# Patient Record
Sex: MALE | URBAN - METROPOLITAN AREA
[De-identification: names, ages, dates, MRNs, and addresses within clinical notes are randomized per-mention and may not be internally consistent; named-entity substitution may affect disease eponyms.]

---

## 2017-01-02 PROBLEM — R07.9 CHEST PAIN: Status: ACTIVE | Noted: 2017-01-02

## 2017-01-04 ENCOUNTER — CARE COORDINATION (OUTPATIENT)
Dept: CARE COORDINATION | Age: 47
End: 2017-01-04

## 2017-01-10 ENCOUNTER — TELEPHONE (OUTPATIENT)
Dept: INTERNAL MEDICINE | Age: 47
End: 2017-01-10

## 2017-01-10 RX ORDER — TAMSULOSIN HYDROCHLORIDE 0.4 MG/1
0.4 CAPSULE ORAL DAILY
Qty: 30 CAPSULE | Refills: 12 | Status: SHIPPED | OUTPATIENT
Start: 2017-01-10 | End: 2017-02-07 | Stop reason: ALTCHOICE

## 2017-01-13 ENCOUNTER — OFFICE VISIT (OUTPATIENT)
Dept: INTERNAL MEDICINE | Age: 47
End: 2017-01-13

## 2017-01-13 VITALS
DIASTOLIC BLOOD PRESSURE: 86 MMHG | SYSTOLIC BLOOD PRESSURE: 128 MMHG | OXYGEN SATURATION: 93 % | WEIGHT: 245 LBS | BODY MASS INDEX: 26.91 KG/M2 | HEART RATE: 79 BPM

## 2017-01-13 DIAGNOSIS — R07.9 CHEST PAIN, UNSPECIFIED TYPE: Primary | ICD-10-CM

## 2017-01-13 PROCEDURE — 99999 PR OFFICE/OUTPT VISIT,PROCEDURE ONLY: CPT | Performed by: INTERNAL MEDICINE

## 2017-01-16 ENCOUNTER — OFFICE VISIT (OUTPATIENT)
Dept: INTERNAL MEDICINE | Age: 47
End: 2017-01-16

## 2017-01-16 VITALS
OXYGEN SATURATION: 98 % | HEART RATE: 85 BPM | HEIGHT: 78 IN | SYSTOLIC BLOOD PRESSURE: 130 MMHG | BODY MASS INDEX: 28.58 KG/M2 | DIASTOLIC BLOOD PRESSURE: 80 MMHG | WEIGHT: 247 LBS

## 2017-01-16 DIAGNOSIS — I51.7 LEFT VENTRICULAR HYPERTROPHY: ICD-10-CM

## 2017-01-16 DIAGNOSIS — R07.9 CHEST PAIN, UNSPECIFIED TYPE: Primary | ICD-10-CM

## 2017-01-16 DIAGNOSIS — I51.89 DIASTOLIC DYSFUNCTION: ICD-10-CM

## 2017-01-16 DIAGNOSIS — K21.9 GASTROESOPHAGEAL REFLUX DISEASE WITHOUT ESOPHAGITIS: Chronic | ICD-10-CM

## 2017-01-16 DIAGNOSIS — R68.89 HYPOKINESIS: ICD-10-CM

## 2017-01-16 PROCEDURE — 99213 OFFICE O/P EST LOW 20 MIN: CPT | Performed by: INTERNAL MEDICINE

## 2017-01-16 RX ORDER — ASPIRIN 81 MG/1
81 TABLET ORAL DAILY
COMMUNITY
Start: 2017-01-16 | End: 2017-02-01 | Stop reason: ALTCHOICE

## 2017-01-16 RX ORDER — OMEPRAZOLE 40 MG/1
40 CAPSULE, DELAYED RELEASE ORAL DAILY
Qty: 30 CAPSULE | Refills: 12 | Status: SHIPPED | OUTPATIENT
Start: 2017-01-16 | End: 2017-02-15

## 2017-01-16 ASSESSMENT — ENCOUNTER SYMPTOMS
CHEST TIGHTNESS: 0
APNEA: 0
EYES NEGATIVE: 1
STRIDOR: 0
COUGH: 0
WHEEZING: 0
SHORTNESS OF BREATH: 1
CHOKING: 0
GASTROINTESTINAL NEGATIVE: 1

## 2017-01-25 DIAGNOSIS — I25.10 ARTERIOSCLEROTIC CORONARY ARTERY DISEASE: Primary | ICD-10-CM

## 2017-01-27 ENCOUNTER — TELEPHONE (OUTPATIENT)
Dept: INTERNAL MEDICINE | Age: 47
End: 2017-01-27

## 2017-02-01 ENCOUNTER — OFFICE VISIT (OUTPATIENT)
Dept: CARDIOLOGY CLINIC | Age: 47
End: 2017-02-01

## 2017-02-01 VITALS
DIASTOLIC BLOOD PRESSURE: 70 MMHG | BODY MASS INDEX: 28.11 KG/M2 | HEART RATE: 69 BPM | HEIGHT: 78 IN | WEIGHT: 243 LBS | SYSTOLIC BLOOD PRESSURE: 108 MMHG

## 2017-02-01 DIAGNOSIS — I51.7 LEFT VENTRICULAR HYPERTROPHY: ICD-10-CM

## 2017-02-01 DIAGNOSIS — I10 ESSENTIAL HYPERTENSION: ICD-10-CM

## 2017-02-01 DIAGNOSIS — R07.89 OTHER CHEST PAIN: Primary | ICD-10-CM

## 2017-02-01 DIAGNOSIS — E78.00 PURE HYPERCHOLESTEROLEMIA: ICD-10-CM

## 2017-02-01 PROCEDURE — 93000 ELECTROCARDIOGRAM COMPLETE: CPT | Performed by: INTERNAL MEDICINE

## 2017-02-01 PROCEDURE — 99215 OFFICE O/P EST HI 40 MIN: CPT | Performed by: INTERNAL MEDICINE

## 2017-02-01 RX ORDER — ASPIRIN 81 MG/1
81 TABLET ORAL DAILY
COMMUNITY
End: 2017-02-01 | Stop reason: SDUPTHER

## 2017-02-01 RX ORDER — ASPIRIN 81 MG/1
81 TABLET ORAL DAILY
COMMUNITY
End: 2017-05-09 | Stop reason: SDUPTHER

## 2017-02-03 DIAGNOSIS — R06.09 EXERTIONAL DYSPNEA: ICD-10-CM

## 2017-02-03 DIAGNOSIS — R07.89 OTHER CHEST PAIN: ICD-10-CM

## 2017-02-03 DIAGNOSIS — R94.31 ABNORMAL ECG: Primary | ICD-10-CM

## 2017-02-07 ENCOUNTER — TELEPHONE (OUTPATIENT)
Dept: INTERNAL MEDICINE | Age: 47
End: 2017-02-07

## 2017-02-07 RX ORDER — SILODOSIN 4 MG/1
4 CAPSULE ORAL EVERY EVENING
Qty: 30 CAPSULE | Refills: 5 | Status: SHIPPED | OUTPATIENT
Start: 2017-02-07 | End: 2017-08-09 | Stop reason: SDUPTHER

## 2017-02-08 ENCOUNTER — HOSPITAL ENCOUNTER (OUTPATIENT)
Dept: CARDIAC CATH/INVASIVE PROCEDURES | Age: 47
Discharge: HOME | End: 2017-02-08
Attending: INTERNAL MEDICINE | Admitting: INTERNAL MEDICINE

## 2017-02-08 VITALS — HEIGHT: 78 IN | BODY MASS INDEX: 28.46 KG/M2 | WEIGHT: 246 LBS | TEMPERATURE: 98.1 F

## 2017-02-08 LAB
ANION GAP SERPL CALCULATED.3IONS-SCNC: 11 MMOL/L (ref 3–16)
BASOPHILS ABSOLUTE: 0 K/UL (ref 0–0.2)
BASOPHILS RELATIVE PERCENT: 0.7 %
BUN BLDV-MCNC: 19 MG/DL (ref 7–20)
CALCIUM SERPL-MCNC: 9.7 MG/DL (ref 8.3–10.6)
CHLORIDE BLD-SCNC: 100 MMOL/L (ref 99–110)
CO2: 27 MMOL/L (ref 21–32)
CREAT SERPL-MCNC: 0.9 MG/DL (ref 0.9–1.3)
EOSINOPHILS ABSOLUTE: 0.1 K/UL (ref 0–0.6)
EOSINOPHILS RELATIVE PERCENT: 2.1 %
GFR AFRICAN AMERICAN: >60
GFR NON-AFRICAN AMERICAN: >60
GLUCOSE BLD-MCNC: 97 MG/DL (ref 70–99)
HCT VFR BLD CALC: 41.9 % (ref 40.5–52.5)
HEMOGLOBIN: 14.3 G/DL (ref 13.5–17.5)
INR BLD: 1.04 (ref 0.85–1.16)
LYMPHOCYTES ABSOLUTE: 2 K/UL (ref 1–5.1)
LYMPHOCYTES RELATIVE PERCENT: 46.1 %
MCH RBC QN AUTO: 28.8 PG (ref 26–34)
MCHC RBC AUTO-ENTMCNC: 34.1 G/DL (ref 31–36)
MCV RBC AUTO: 84.5 FL (ref 80–100)
MONOCYTES ABSOLUTE: 0.3 K/UL (ref 0–1.3)
MONOCYTES RELATIVE PERCENT: 7.3 %
NEUTROPHILS ABSOLUTE: 1.9 K/UL (ref 1.7–7.7)
NEUTROPHILS RELATIVE PERCENT: 43.8 %
PDW BLD-RTO: 13 % (ref 12.4–15.4)
PLATELET # BLD: 279 K/UL (ref 135–450)
PMV BLD AUTO: 7 FL (ref 5–10.5)
POTASSIUM SERPL-SCNC: 3.9 MMOL/L (ref 3.5–5.1)
PROTHROMBIN TIME: 11.9 SEC (ref 9.8–13)
RBC # BLD: 4.96 M/UL (ref 4.2–5.9)
SODIUM BLD-SCNC: 138 MMOL/L (ref 136–145)
WBC # BLD: 4.3 K/UL (ref 4–11)

## 2017-02-08 RX ORDER — SODIUM CHLORIDE 0.9 % (FLUSH) 0.9 %
10 SYRINGE (ML) INJECTION EVERY 12 HOURS SCHEDULED
Status: DISCONTINUED | OUTPATIENT
Start: 2017-02-08 | End: 2017-02-09 | Stop reason: HOSPADM

## 2017-02-08 RX ORDER — SODIUM CHLORIDE 9 MG/ML
INJECTION, SOLUTION INTRAVENOUS CONTINUOUS
Status: ACTIVE | OUTPATIENT
Start: 2017-02-08 | End: 2017-02-08

## 2017-02-08 RX ORDER — SODIUM CHLORIDE 0.9 % (FLUSH) 0.9 %
10 SYRINGE (ML) INJECTION PRN
Status: DISCONTINUED | OUTPATIENT
Start: 2017-02-08 | End: 2017-02-09 | Stop reason: HOSPADM

## 2017-02-08 RX ORDER — ONDANSETRON 2 MG/ML
4 INJECTION INTRAMUSCULAR; INTRAVENOUS EVERY 6 HOURS PRN
Status: DISCONTINUED | OUTPATIENT
Start: 2017-02-08 | End: 2017-02-09 | Stop reason: HOSPADM

## 2017-02-08 RX ORDER — ACETAMINOPHEN 325 MG/1
650 TABLET ORAL EVERY 4 HOURS PRN
Status: DISCONTINUED | OUTPATIENT
Start: 2017-02-08 | End: 2017-02-09 | Stop reason: HOSPADM

## 2017-02-09 LAB
EKG ATRIAL RATE: 71 BPM
EKG DIAGNOSIS: NORMAL
EKG P AXIS: 47 DEGREES
EKG P-R INTERVAL: 186 MS
EKG Q-T INTERVAL: 406 MS
EKG QRS DURATION: 112 MS
EKG QTC CALCULATION (BAZETT): 441 MS
EKG R AXIS: 26 DEGREES
EKG T AXIS: 16 DEGREES
EKG VENTRICULAR RATE: 71 BPM

## 2017-02-15 ENCOUNTER — OFFICE VISIT (OUTPATIENT)
Dept: CARDIOLOGY CLINIC | Age: 47
End: 2017-02-15

## 2017-02-15 VITALS
HEART RATE: 65 BPM | BODY MASS INDEX: 28.02 KG/M2 | SYSTOLIC BLOOD PRESSURE: 112 MMHG | DIASTOLIC BLOOD PRESSURE: 70 MMHG | HEIGHT: 78 IN | WEIGHT: 242.2 LBS

## 2017-02-15 DIAGNOSIS — I51.7 LEFT VENTRICULAR HYPERTROPHY: ICD-10-CM

## 2017-02-15 DIAGNOSIS — E78.00 PURE HYPERCHOLESTEROLEMIA: ICD-10-CM

## 2017-02-15 DIAGNOSIS — Z98.890 S/P CORONARY ANGIOGRAM: ICD-10-CM

## 2017-02-15 DIAGNOSIS — I10 ESSENTIAL HYPERTENSION: Primary | ICD-10-CM

## 2017-02-15 PROCEDURE — 99214 OFFICE O/P EST MOD 30 MIN: CPT | Performed by: NURSE PRACTITIONER

## 2017-03-29 ENCOUNTER — OFFICE VISIT (OUTPATIENT)
Dept: CARDIOLOGY CLINIC | Age: 47
End: 2017-03-29

## 2017-03-29 VITALS
HEART RATE: 82 BPM | DIASTOLIC BLOOD PRESSURE: 60 MMHG | BODY MASS INDEX: 26.26 KG/M2 | SYSTOLIC BLOOD PRESSURE: 102 MMHG | WEIGHT: 239 LBS

## 2017-03-29 DIAGNOSIS — E78.00 PURE HYPERCHOLESTEROLEMIA: ICD-10-CM

## 2017-03-29 DIAGNOSIS — I25.10 ARTERIOSCLEROTIC CORONARY ARTERY DISEASE: Primary | ICD-10-CM

## 2017-03-29 DIAGNOSIS — I10 ESSENTIAL HYPERTENSION: Chronic | ICD-10-CM

## 2017-03-29 DIAGNOSIS — R94.31 ABNORMAL ECG: ICD-10-CM

## 2017-03-29 PROCEDURE — 99214 OFFICE O/P EST MOD 30 MIN: CPT | Performed by: INTERNAL MEDICINE

## 2017-04-03 LAB
A/G RATIO: 1.3 (ref 1.1–2.2)
ALBUMIN SERPL-MCNC: 4.3 G/DL (ref 3.4–5)
ALP BLD-CCNC: 65 U/L (ref 40–129)
ALT SERPL-CCNC: 45 U/L (ref 10–40)
ANION GAP SERPL CALCULATED.3IONS-SCNC: 15 MMOL/L (ref 3–16)
AST SERPL-CCNC: 34 U/L (ref 15–37)
BILIRUB SERPL-MCNC: 0.3 MG/DL (ref 0–1)
BUN BLDV-MCNC: 15 MG/DL (ref 7–20)
CALCIUM SERPL-MCNC: 10 MG/DL (ref 8.3–10.6)
CHLORIDE BLD-SCNC: 102 MMOL/L (ref 99–110)
CHOLESTEROL, TOTAL: 202 MG/DL (ref 0–199)
CO2: 26 MMOL/L (ref 21–32)
CREAT SERPL-MCNC: 1 MG/DL (ref 0.9–1.3)
GFR AFRICAN AMERICAN: >60
GFR NON-AFRICAN AMERICAN: >60
GLOBULIN: 3.2 G/DL
GLUCOSE BLD-MCNC: 94 MG/DL (ref 70–99)
HDLC SERPL-MCNC: 60 MG/DL (ref 40–60)
LDL CHOLESTEROL CALCULATED: 130 MG/DL
POTASSIUM SERPL-SCNC: 4.3 MMOL/L (ref 3.5–5.1)
SODIUM BLD-SCNC: 143 MMOL/L (ref 136–145)
TOTAL PROTEIN: 7.5 G/DL (ref 6.4–8.2)
TRIGL SERPL-MCNC: 59 MG/DL (ref 0–150)
VLDLC SERPL CALC-MCNC: 12 MG/DL

## 2017-04-19 ENCOUNTER — OFFICE VISIT (OUTPATIENT)
Dept: INTERNAL MEDICINE | Age: 47
End: 2017-04-19

## 2017-04-19 VITALS
OXYGEN SATURATION: 98 % | BODY MASS INDEX: 27.42 KG/M2 | WEIGHT: 237 LBS | HEIGHT: 78 IN | HEART RATE: 74 BPM | SYSTOLIC BLOOD PRESSURE: 130 MMHG | DIASTOLIC BLOOD PRESSURE: 88 MMHG

## 2017-04-19 DIAGNOSIS — I10 ESSENTIAL HYPERTENSION: Primary | Chronic | ICD-10-CM

## 2017-04-19 DIAGNOSIS — E78.5 HYPERLIPIDEMIA, UNSPECIFIED HYPERLIPIDEMIA TYPE: Chronic | ICD-10-CM

## 2017-04-19 PROCEDURE — 99214 OFFICE O/P EST MOD 30 MIN: CPT | Performed by: INTERNAL MEDICINE

## 2017-04-19 RX ORDER — ATORVASTATIN CALCIUM 40 MG/1
40 TABLET, FILM COATED ORAL DAILY
Qty: 90 TABLET | Refills: 3 | Status: SHIPPED | OUTPATIENT
Start: 2017-04-19 | End: 2017-05-09 | Stop reason: SDUPTHER

## 2017-04-19 ASSESSMENT — ENCOUNTER SYMPTOMS
SHORTNESS OF BREATH: 0
ORTHOPNEA: 0
BLURRED VISION: 0

## 2017-04-19 ASSESSMENT — PATIENT HEALTH QUESTIONNAIRE - PHQ9
SUM OF ALL RESPONSES TO PHQ9 QUESTIONS 1 & 2: 0
2. FEELING DOWN, DEPRESSED OR HOPELESS: 0
SUM OF ALL RESPONSES TO PHQ QUESTIONS 1-9: 0
1. LITTLE INTEREST OR PLEASURE IN DOING THINGS: 0

## 2017-04-21 ENCOUNTER — TELEPHONE (OUTPATIENT)
Dept: INTERNAL MEDICINE | Age: 47
End: 2017-04-21

## 2017-04-22 ASSESSMENT — ENCOUNTER SYMPTOMS
EYES NEGATIVE: 1
RESPIRATORY NEGATIVE: 1
GASTROINTESTINAL NEGATIVE: 1

## 2017-05-09 DIAGNOSIS — E78.5 HYPERLIPIDEMIA, UNSPECIFIED HYPERLIPIDEMIA TYPE: Chronic | ICD-10-CM

## 2017-05-09 RX ORDER — ATORVASTATIN CALCIUM 40 MG/1
40 TABLET, FILM COATED ORAL DAILY
Qty: 90 TABLET | Refills: 1 | Status: SHIPPED | OUTPATIENT
Start: 2017-05-09 | End: 2018-05-02 | Stop reason: SDUPTHER

## 2017-05-09 RX ORDER — ASPIRIN 81 MG/1
81 TABLET ORAL DAILY
Qty: 30 TABLET | Refills: 5 | Status: SHIPPED | OUTPATIENT
Start: 2017-05-09 | End: 2017-11-27 | Stop reason: SDUPTHER

## 2017-08-09 RX ORDER — SILODOSIN 4 MG/1
CAPSULE ORAL
Qty: 30 CAPSULE | Refills: 1 | Status: SHIPPED | OUTPATIENT
Start: 2017-08-09 | End: 2017-08-21 | Stop reason: ALTCHOICE

## 2017-08-21 ENCOUNTER — OFFICE VISIT (OUTPATIENT)
Dept: INTERNAL MEDICINE | Age: 47
End: 2017-08-21

## 2017-08-21 ENCOUNTER — OFFICE VISIT (OUTPATIENT)
Dept: ORTHOPEDIC SURGERY | Age: 47
End: 2017-08-21

## 2017-08-21 VITALS
TEMPERATURE: 97.5 F | HEART RATE: 70 BPM | HEIGHT: 78 IN | SYSTOLIC BLOOD PRESSURE: 130 MMHG | OXYGEN SATURATION: 97 % | DIASTOLIC BLOOD PRESSURE: 82 MMHG | BODY MASS INDEX: 27.19 KG/M2 | WEIGHT: 235 LBS

## 2017-08-21 VITALS — HEIGHT: 78 IN | WEIGHT: 235 LBS | BODY MASS INDEX: 27.19 KG/M2

## 2017-08-21 DIAGNOSIS — Z00.00 ROUTINE GENERAL MEDICAL EXAMINATION AT A HEALTH CARE FACILITY: Primary | ICD-10-CM

## 2017-08-21 DIAGNOSIS — E78.5 HYPERLIPIDEMIA, UNSPECIFIED HYPERLIPIDEMIA TYPE: Chronic | ICD-10-CM

## 2017-08-21 DIAGNOSIS — I10 ESSENTIAL HYPERTENSION: Chronic | ICD-10-CM

## 2017-08-21 DIAGNOSIS — M25.531 BILATERAL WRIST PAIN: Primary | ICD-10-CM

## 2017-08-21 DIAGNOSIS — I25.10 ARTERIOSCLEROTIC CORONARY ARTERY DISEASE: ICD-10-CM

## 2017-08-21 DIAGNOSIS — M25.532 BILATERAL WRIST PAIN: Primary | ICD-10-CM

## 2017-08-21 PROCEDURE — 73110 X-RAY EXAM OF WRIST: CPT | Performed by: ORTHOPAEDIC SURGERY

## 2017-08-21 PROCEDURE — 99203 OFFICE O/P NEW LOW 30 MIN: CPT | Performed by: ORTHOPAEDIC SURGERY

## 2017-08-21 PROCEDURE — 99396 PREV VISIT EST AGE 40-64: CPT | Performed by: NURSE PRACTITIONER

## 2017-08-21 ASSESSMENT — ENCOUNTER SYMPTOMS
ORTHOPNEA: 0
PHOTOPHOBIA: 0
HEMOPTYSIS: 0
SORE THROAT: 0
STRIDOR: 0
EYE DISCHARGE: 0
WHEEZING: 0
DOUBLE VISION: 0
EYE PAIN: 0
DIARRHEA: 0
SHORTNESS OF BREATH: 0
HEARTBURN: 0
NAUSEA: 0
BLURRED VISION: 0
COUGH: 0
BLOOD IN STOOL: 0
ABDOMINAL PAIN: 0
CONSTIPATION: 0
SPUTUM PRODUCTION: 0
BACK PAIN: 0
EYE REDNESS: 0
VOMITING: 0

## 2017-09-01 ENCOUNTER — TELEPHONE (OUTPATIENT)
Dept: INTERNAL MEDICINE | Age: 47
End: 2017-09-01

## 2017-09-11 ENCOUNTER — OFFICE VISIT (OUTPATIENT)
Dept: ORTHOPEDIC SURGERY | Age: 47
End: 2017-09-11

## 2017-09-11 VITALS — HEIGHT: 78 IN | WEIGHT: 235.01 LBS | BODY MASS INDEX: 27.19 KG/M2

## 2017-09-11 DIAGNOSIS — M24.132 DEGENERATIVE TFCC TEAR, LEFT: Primary | ICD-10-CM

## 2017-09-11 DIAGNOSIS — M67.432 GANGLION OF WRIST, LEFT: ICD-10-CM

## 2017-09-11 PROCEDURE — 99213 OFFICE O/P EST LOW 20 MIN: CPT | Performed by: ORTHOPAEDIC SURGERY

## 2017-09-14 ENCOUNTER — TELEPHONE (OUTPATIENT)
Dept: INTERNAL MEDICINE | Age: 47
End: 2017-09-14

## 2017-09-14 DIAGNOSIS — Z12.5 SPECIAL SCREENING FOR MALIGNANT NEOPLASM OF PROSTATE: ICD-10-CM

## 2017-09-14 DIAGNOSIS — I10 ESSENTIAL HYPERTENSION: Primary | Chronic | ICD-10-CM

## 2017-09-14 DIAGNOSIS — Z00.00 ROUTINE GENERAL MEDICAL EXAMINATION AT A HEALTH CARE FACILITY: ICD-10-CM

## 2017-09-14 DIAGNOSIS — E78.5 HYPERLIPIDEMIA, UNSPECIFIED HYPERLIPIDEMIA TYPE: Chronic | ICD-10-CM

## 2017-09-14 DIAGNOSIS — Z11.59 NEED FOR HEPATITIS C SCREENING TEST: ICD-10-CM

## 2017-09-18 DIAGNOSIS — R07.89 OTHER CHEST PAIN: ICD-10-CM

## 2017-09-18 DIAGNOSIS — R94.31 ABNORMAL ECG: ICD-10-CM

## 2017-09-18 DIAGNOSIS — R06.09 EXERTIONAL DYSPNEA: ICD-10-CM

## 2017-09-18 LAB
ALBUMIN SERPL-MCNC: 4.2 G/DL (ref 3.4–5)
ALP BLD-CCNC: 54 U/L (ref 40–129)
ALT SERPL-CCNC: 41 U/L (ref 10–40)
ANION GAP SERPL CALCULATED.3IONS-SCNC: 14 MMOL/L (ref 3–16)
AST SERPL-CCNC: 38 U/L (ref 15–37)
BASOPHILS ABSOLUTE: 0 K/UL (ref 0–0.2)
BASOPHILS RELATIVE PERCENT: 0.6 %
BILIRUB SERPL-MCNC: 0.5 MG/DL (ref 0–1)
BILIRUBIN DIRECT: <0.2 MG/DL (ref 0–0.3)
BILIRUBIN URINE: NEGATIVE
BILIRUBIN, INDIRECT: ABNORMAL MG/DL (ref 0–1)
BLOOD, URINE: NEGATIVE
BUN BLDV-MCNC: 20 MG/DL (ref 7–20)
CALCIUM SERPL-MCNC: 9.5 MG/DL (ref 8.3–10.6)
CHLORIDE BLD-SCNC: 99 MMOL/L (ref 99–110)
CHOLESTEROL, TOTAL: 204 MG/DL (ref 0–199)
CLARITY: CLEAR
CO2: 24 MMOL/L (ref 21–32)
COLOR: YELLOW
CREAT SERPL-MCNC: 0.9 MG/DL (ref 0.9–1.3)
EOSINOPHILS ABSOLUTE: 0.1 K/UL (ref 0–0.6)
EOSINOPHILS RELATIVE PERCENT: 3.1 %
GFR AFRICAN AMERICAN: >60
GFR NON-AFRICAN AMERICAN: >60
GLUCOSE BLD-MCNC: 88 MG/DL (ref 70–99)
GLUCOSE URINE: NEGATIVE MG/DL
HCT VFR BLD CALC: 43.3 % (ref 40.5–52.5)
HDLC SERPL-MCNC: 58 MG/DL (ref 40–60)
HEMOGLOBIN: 14.5 G/DL (ref 13.5–17.5)
HEPATITIS C ANTIBODY INTERPRETATION: NORMAL
KETONES, URINE: NEGATIVE MG/DL
LDL CHOLESTEROL CALCULATED: 131 MG/DL
LEUKOCYTE ESTERASE, URINE: NEGATIVE
LYMPHOCYTES ABSOLUTE: 2.1 K/UL (ref 1–5.1)
LYMPHOCYTES RELATIVE PERCENT: 43.8 %
MCH RBC QN AUTO: 29.1 PG (ref 26–34)
MCHC RBC AUTO-ENTMCNC: 33.4 G/DL (ref 31–36)
MCV RBC AUTO: 87 FL (ref 80–100)
MICROSCOPIC EXAMINATION: NORMAL
MONOCYTES ABSOLUTE: 0.3 K/UL (ref 0–1.3)
MONOCYTES RELATIVE PERCENT: 6 %
NEUTROPHILS ABSOLUTE: 2.2 K/UL (ref 1.7–7.7)
NEUTROPHILS RELATIVE PERCENT: 46.5 %
NITRITE, URINE: NEGATIVE
PDW BLD-RTO: 13.1 % (ref 12.4–15.4)
PH UA: 7
PHOSPHORUS: 3.2 MG/DL (ref 2.5–4.9)
PLATELET # BLD: 295 K/UL (ref 135–450)
PMV BLD AUTO: 8 FL (ref 5–10.5)
POTASSIUM SERPL-SCNC: 4.3 MMOL/L (ref 3.5–5.1)
PROSTATE SPECIFIC ANTIGEN: 0.81 NG/ML (ref 0–4)
PROTEIN UA: NEGATIVE MG/DL
RBC # BLD: 4.97 M/UL (ref 4.2–5.9)
SODIUM BLD-SCNC: 137 MMOL/L (ref 136–145)
SPECIFIC GRAVITY UA: 1.02
TOTAL PROTEIN: 7.1 G/DL (ref 6.4–8.2)
TRIGL SERPL-MCNC: 77 MG/DL (ref 0–150)
TSH SERPL DL<=0.05 MIU/L-ACNC: 0.98 UIU/ML (ref 0.27–4.2)
URINE TYPE: NORMAL
UROBILINOGEN, URINE: 0.2 E.U./DL
VLDLC SERPL CALC-MCNC: 15 MG/DL
WBC # BLD: 4.7 K/UL (ref 4–11)

## 2017-10-02 ENCOUNTER — OFFICE VISIT (OUTPATIENT)
Dept: ORTHOPEDIC SURGERY | Age: 47
End: 2017-10-02

## 2017-10-02 VITALS — BODY MASS INDEX: 27.19 KG/M2 | WEIGHT: 235.01 LBS | HEIGHT: 78 IN

## 2017-10-02 DIAGNOSIS — S63.592D TFCC (TRIANGULAR FIBROCARTILAGE COMPLEX) TEAR, LEFT, SUBSEQUENT ENCOUNTER: Primary | ICD-10-CM

## 2017-10-02 PROCEDURE — 99213 OFFICE O/P EST LOW 20 MIN: CPT | Performed by: ORTHOPAEDIC SURGERY

## 2017-10-02 NOTE — MR AVS SNAPSHOT
After Visit Summary             Alejandrina Brooks   10/2/2017 2:10 PM   Office Visit    Description:  Male : 1970   Provider:  Albaro Bertrand MD   Department:  [facility]              Your Follow-Up and Future Appointments         Below is a list of your follow-up and future appointments. This may not be a complete list as you may have made appointments directly with providers that we are not aware of or your providers may have made some for you. Please call your providers to confirm appointments. It is important to keep your appointments. Please bring your current insurance card, photo ID, co-pay, and all medication bottles to your appointment. If self-pay, payment is expected at the time of service. Your To-Do List     Future Appointments Provider Department Dept Phone    2018 1:30 PM Paige Troy MD [address] [place] [address] [phone]    Please arrive 15 minutes prior to appointment, bring photo ID and insurance card. Follow-Up    Return if symptoms worsen or fail to improve. Information from Your Visit        Department     Name Address Phone Fax    [facility] [address]  [address] [address] [place] [address] [id number] [place] [address] [phone]      You Were Seen for:         Comments    TFCC (triangular fibrocartilage complex) tear, left, subsequent encounter   [[id number]]         Vital Signs     Height Weight Body Mass Index Smoking Status          6' 7.92\" (2.03 m) 235 lb 0.2 oz (106.6 kg) 25.87 kg/m2 Never Smoker        Additional Information about your Body Mass Index (BMI)           Your BMI as listed above is considered overweight (25.0-29.9). BMI is an estimate of body fat, calculated from your height and weight.   The higher your BMI, the greater your risk of heart disease, high blood pressure, type 2 diabetes, stroke, gallstones, arthritis, sleep apnea, and certain cancers. BMI is not perfect. It may overestimate body fat in athletes and people who are more muscular. If your body fat is high you can improve your BMI by decreasing your calorie intake and becoming more physically active. Learn more at: iDoneThisco.uk             Medications and Orders      Your Current Medications Are              aspirin (ECOTRIN LOW STRENGTH) 81 MG EC tablet Take 1 tablet by mouth daily    atorvastatin (LIPITOR) 40 MG tablet Take 1 tablet by mouth daily    lisinopril-hydrochlorothiazide (PRINZIDE;ZESTORETIC) 20-12.5 MG per tablet Take 1 tablet by mouth daily      Allergies           No Known Allergies         Additional Information        Basic Information     Date Of Birth Sex Race Ethnicity Preferred Language    1970 Male Black Non-/Non  English      Problem List as of 10/2/2017  Date Reviewed: 9/11/2017                Exertional dyspnea    Abnormal ECG    Essential hypertension (Chronic)    Hyperlipidemia (Chronic)    Microscopic hematuria (Chronic)    Gastroesophageal reflux disease without esophagitis (Chronic)    Benign prostatic hypertrophy (Chronic)    Need for hepatitis C screening test    Arteriosclerotic coronary artery disease    Hypokinesis    Diastolic dysfunction    Left ventricular hypertrophy    Pure hypercholesterolemia    Chest pain    Nonintractable episodic headache    Skin lesion    Urinary urgency    Tendonitis of elbow, right    Elevated blood-pressure reading without diagnosis of hypertension    Routine general medical examination at a health care facility    Special screening for malignant neoplasms, colon    Special screening for malignant neoplasm of prostate      Preventive Care        Date Due    HIV screening is recommended for all people regardless of risk factors  aged 15-65 years at least once (lifetime) who have never been HIV tested.  10/16/1985

## 2017-10-25 ENCOUNTER — TELEPHONE (OUTPATIENT)
Dept: INTERNAL MEDICINE | Age: 47
End: 2017-10-25

## 2017-10-25 RX ORDER — SILODOSIN 4 MG/1
CAPSULE ORAL
Refills: 1 | COMMUNITY
Start: 2017-09-12 | End: 2017-10-25 | Stop reason: SDUPTHER

## 2017-10-25 RX ORDER — SILODOSIN 4 MG/1
4 CAPSULE ORAL EVERY EVENING
Qty: 30 CAPSULE | Refills: 2 | Status: SHIPPED | OUTPATIENT
Start: 2017-10-25 | End: 2017-12-27 | Stop reason: SDUPTHER

## 2017-10-25 NOTE — TELEPHONE ENCOUNTER
Trina De León calling for the following   Pt needs RX for. .. RAPAFLO 4 MG CAPS capsule  Sent to . .. Pharm on file . .. Advised Pt to check with Pharm in 24-48 hours . ..  Pls send  Informed that Dr Eileen Yepez no longer with practice

## 2017-11-28 RX ORDER — ASPIRIN 81 MG
81 TABLET, DELAYED RELEASE (ENTERIC COATED) ORAL DAILY
Qty: 30 TABLET | Refills: 0 | Status: SHIPPED | OUTPATIENT
Start: 2017-11-28 | End: 2018-05-02

## 2017-11-28 RX ORDER — ASPIRIN 81 MG/1
TABLET, CHEWABLE ORAL
Qty: 30 TABLET | Refills: 0 | OUTPATIENT
Start: 2017-11-28

## 2018-04-12 PROBLEM — Z11.59 NEED FOR HEPATITIS C SCREENING TEST: Status: RESOLVED | Noted: 2017-09-14 | Resolved: 2018-04-12

## 2018-05-02 ENCOUNTER — OFFICE VISIT (OUTPATIENT)
Dept: CARDIOLOGY CLINIC | Age: 48
End: 2018-05-02

## 2018-05-02 VITALS
HEART RATE: 66 BPM | DIASTOLIC BLOOD PRESSURE: 70 MMHG | BODY MASS INDEX: 23.97 KG/M2 | WEIGHT: 217.8 LBS | SYSTOLIC BLOOD PRESSURE: 102 MMHG

## 2018-05-02 DIAGNOSIS — I25.10 CORONARY ARTERY DISEASE INVOLVING NATIVE CORONARY ARTERY OF NATIVE HEART WITHOUT ANGINA PECTORIS: ICD-10-CM

## 2018-05-02 DIAGNOSIS — E78.5 HYPERLIPIDEMIA, UNSPECIFIED HYPERLIPIDEMIA TYPE: Primary | Chronic | ICD-10-CM

## 2018-05-02 PROCEDURE — 93000 ELECTROCARDIOGRAM COMPLETE: CPT | Performed by: INTERNAL MEDICINE

## 2018-05-02 PROCEDURE — 99213 OFFICE O/P EST LOW 20 MIN: CPT | Performed by: INTERNAL MEDICINE

## 2018-05-02 RX ORDER — ATORVASTATIN CALCIUM 40 MG/1
40 TABLET, FILM COATED ORAL DAILY
Qty: 90 TABLET | Refills: 3 | Status: SHIPPED | OUTPATIENT
Start: 2018-05-02 | End: 2018-08-28

## 2018-06-13 ENCOUNTER — OFFICE VISIT (OUTPATIENT)
Dept: INTERNAL MEDICINE | Age: 48
End: 2018-06-13

## 2018-06-13 VITALS
BODY MASS INDEX: 24.76 KG/M2 | TEMPERATURE: 98.6 F | HEIGHT: 78 IN | DIASTOLIC BLOOD PRESSURE: 74 MMHG | SYSTOLIC BLOOD PRESSURE: 110 MMHG | HEART RATE: 69 BPM | WEIGHT: 214 LBS | OXYGEN SATURATION: 97 %

## 2018-06-13 DIAGNOSIS — R10.9 FLANK PAIN: Primary | ICD-10-CM

## 2018-06-13 LAB
BILIRUBIN, POC: NORMAL
BLOOD URINE, POC: NORMAL
CLARITY, POC: CLEAR
COLOR, POC: YELLOW
GLUCOSE URINE, POC: NORMAL
KETONES, POC: NORMAL
LEUKOCYTE EST, POC: NORMAL
NITRITE, POC: NORMAL
PH, POC: 6
PROTEIN, POC: NORMAL
SPECIFIC GRAVITY, POC: 1.01
UROBILINOGEN, POC: NORMAL

## 2018-06-13 PROCEDURE — 99213 OFFICE O/P EST LOW 20 MIN: CPT | Performed by: NURSE PRACTITIONER

## 2018-06-13 PROCEDURE — 81002 URINALYSIS NONAUTO W/O SCOPE: CPT | Performed by: NURSE PRACTITIONER

## 2018-06-13 ASSESSMENT — PATIENT HEALTH QUESTIONNAIRE - PHQ9
SUM OF ALL RESPONSES TO PHQ9 QUESTIONS 1 & 2: 0
SUM OF ALL RESPONSES TO PHQ QUESTIONS 1-9: 0
2. FEELING DOWN, DEPRESSED OR HOPELESS: 0
1. LITTLE INTEREST OR PLEASURE IN DOING THINGS: 0

## 2018-06-13 ASSESSMENT — ENCOUNTER SYMPTOMS
DIARRHEA: 0
COUGH: 0
SHORTNESS OF BREATH: 0
BACK PAIN: 1
NAUSEA: 0
VOMITING: 0
CONSTIPATION: 0
WHEEZING: 0

## 2018-06-15 LAB — URINE CULTURE, ROUTINE: NORMAL

## 2018-08-28 ENCOUNTER — OFFICE VISIT (OUTPATIENT)
Dept: DERMATOLOGY | Age: 48
End: 2018-08-28

## 2018-08-28 DIAGNOSIS — D48.5 NEOPLASM OF UNCERTAIN BEHAVIOR OF SKIN: ICD-10-CM

## 2018-08-28 DIAGNOSIS — L28.1 PRURIGO NODULARIS: Primary | ICD-10-CM

## 2018-08-28 DIAGNOSIS — R20.9 DISTURBANCE OF SKIN SENSATION: ICD-10-CM

## 2018-08-28 PROCEDURE — 11900 INJECT SKIN LESIONS </W 7: CPT | Performed by: DERMATOLOGY

## 2018-08-28 PROCEDURE — 11100 PR BIOPSY OF SKIN LESION: CPT | Performed by: DERMATOLOGY

## 2018-08-28 PROCEDURE — 99213 OFFICE O/P EST LOW 20 MIN: CPT | Performed by: DERMATOLOGY

## 2018-08-28 RX ORDER — FLUOCINONIDE 1 MG/G
CREAM TOPICAL
Qty: 30 G | Refills: 0 | Status: SHIPPED | OUTPATIENT
Start: 2018-08-28 | End: 2018-09-04 | Stop reason: SDUPTHER

## 2018-08-28 NOTE — PROGRESS NOTES
0.1 % CREA Apply sparingly to affected area(s) bid prn for flares, up to 2 weeks at a time on any given area. Do not apply on cleared skin. 30 g 0       Physical Examination     The following were examined and determined to be normal: Psych/Neuro. The following were examined and determined to be abnormal: Abdomen and RUE. -General: Well-appearing, NAD  1. R distal extensor forearm - 2 closely set <9IR round thickly lichenified brown papules  2. R flank - 8mm round focally eroded brown papule       Assessment and Plan     1. Symptomatic prurigo nodularis, R forearm    -IL kenalog 10 mg/ml; total 0.2 ml to 2 lesion(s). Edu re: atrophy, dyspigmentation.   -Vanos crm bid prn. Edu re: sparing use, atrophy, striae, hypopigmentation, telangiectasias. 2. Neoplasm of uncertain behavior of skin - R/o cyst, R flank  -Discussed possible diagnosis. Patient agreeable to biopsy. Verbal consent obtained after risks (infection, bleeding, scar), benefits and alternatives explained. -Area(s) to be biopsied were marked with a surgical pen. Site(s) were cleansed with alcohol. Local anesthesia achieved with 1% lidocaine with epinephrine/sodium bicarbonate. 8mm punch biopsy was performed followed by placement of simple interrupted 4-0 nylon sutures. Specimen(s) sent to pathology. The wound(s) were dressed with petrolatum and covered with a bandage.  Pt was educated re: risk of bleeding, infection, scar, wound care instructions and suture removal.

## 2018-08-30 LAB — DERMATOLOGY PATHOLOGY REPORT: NORMAL

## 2018-09-04 ENCOUNTER — TELEPHONE (OUTPATIENT)
Dept: DERMATOLOGY | Age: 48
End: 2018-09-04

## 2018-09-04 RX ORDER — FLUOCINONIDE 1 MG/G
CREAM TOPICAL
Qty: 30 G | Refills: 0 | Status: SHIPPED | OUTPATIENT
Start: 2018-09-04 | End: 2022-01-17

## 2018-09-04 NOTE — TELEPHONE ENCOUNTER
Pt lmvm   Pt c/b [phone]  Pt states:   - change pharmacies   - medication Fluocinide   Pharm [facility] on [phone]. [phone]  Please fadia;l to discuss thanks

## 2018-10-11 ENCOUNTER — TELEPHONE (OUTPATIENT)
Dept: INTERNAL MEDICINE CLINIC | Age: 48
End: 2018-10-11

## 2019-01-03 ENCOUNTER — TELEPHONE (OUTPATIENT)
Dept: INTERNAL MEDICINE CLINIC | Age: 49
End: 2019-01-03

## 2019-01-04 ENCOUNTER — OFFICE VISIT (OUTPATIENT)
Dept: INTERNAL MEDICINE CLINIC | Age: 49
End: 2019-01-04
Payer: COMMERCIAL

## 2019-01-04 VITALS
HEART RATE: 71 BPM | DIASTOLIC BLOOD PRESSURE: 82 MMHG | HEIGHT: 78 IN | SYSTOLIC BLOOD PRESSURE: 122 MMHG | OXYGEN SATURATION: 100 % | WEIGHT: 230 LBS | BODY MASS INDEX: 26.61 KG/M2

## 2019-01-04 DIAGNOSIS — Z00.00 ROUTINE GENERAL MEDICAL EXAMINATION AT A HEALTH CARE FACILITY: Primary | ICD-10-CM

## 2019-01-04 DIAGNOSIS — N40.0 BENIGN PROSTATIC HYPERPLASIA WITHOUT LOWER URINARY TRACT SYMPTOMS: Chronic | ICD-10-CM

## 2019-01-04 DIAGNOSIS — I10 ESSENTIAL HYPERTENSION: Chronic | ICD-10-CM

## 2019-01-04 DIAGNOSIS — E78.2 MIXED HYPERLIPIDEMIA: Chronic | ICD-10-CM

## 2019-01-04 PROCEDURE — 99396 PREV VISIT EST AGE 40-64: CPT | Performed by: NURSE PRACTITIONER

## 2019-01-04 RX ORDER — LISINOPRIL 10 MG/1
10 TABLET ORAL DAILY
COMMUNITY
End: 2020-03-26 | Stop reason: SDUPTHER

## 2019-01-04 ASSESSMENT — ENCOUNTER SYMPTOMS
VOMITING: 0
NAUSEA: 0
SORE THROAT: 0
BLOOD IN STOOL: 0
SHORTNESS OF BREATH: 0
DIARRHEA: 0
COUGH: 0
CONSTIPATION: 0
SINUS PRESSURE: 0
CHEST TIGHTNESS: 0
EYE ITCHING: 0
RHINORRHEA: 0
COLOR CHANGE: 0
EYE REDNESS: 0
WHEEZING: 0
ABDOMINAL PAIN: 0
BACK PAIN: 0

## 2019-01-04 ASSESSMENT — PATIENT HEALTH QUESTIONNAIRE - PHQ9
2. FEELING DOWN, DEPRESSED OR HOPELESS: 0
1. LITTLE INTEREST OR PLEASURE IN DOING THINGS: 0
SUM OF ALL RESPONSES TO PHQ QUESTIONS 1-9: 0
SUM OF ALL RESPONSES TO PHQ9 QUESTIONS 1 & 2: 0
SUM OF ALL RESPONSES TO PHQ QUESTIONS 1-9: 0

## 2019-02-18 ENCOUNTER — TELEPHONE (OUTPATIENT)
Dept: INTERNAL MEDICINE CLINIC | Age: 49
End: 2019-02-18

## 2019-02-18 DIAGNOSIS — Z00.00 ROUTINE GENERAL MEDICAL EXAMINATION AT A HEALTH CARE FACILITY: ICD-10-CM

## 2019-02-18 DIAGNOSIS — R35.0 FREQUENCY OF URINATION: Primary | ICD-10-CM

## 2019-02-18 LAB
ANION GAP SERPL CALCULATED.3IONS-SCNC: 13 MMOL/L (ref 3–16)
BILIRUBIN URINE: NEGATIVE
BLOOD, URINE: NEGATIVE
BUN BLDV-MCNC: 10 MG/DL (ref 7–20)
CALCIUM SERPL-MCNC: 9.8 MG/DL (ref 8.3–10.6)
CHLORIDE BLD-SCNC: 101 MMOL/L (ref 99–110)
CHOLESTEROL, FASTING: 275 MG/DL (ref 0–199)
CLARITY: CLEAR
CO2: 27 MMOL/L (ref 21–32)
COLOR: YELLOW
CREAT SERPL-MCNC: 0.9 MG/DL (ref 0.9–1.3)
EPITHELIAL CELLS, UA: 0 /HPF (ref 0–5)
GFR AFRICAN AMERICAN: >60
GFR NON-AFRICAN AMERICAN: >60
GLUCOSE BLD-MCNC: 106 MG/DL (ref 70–99)
GLUCOSE URINE: NEGATIVE MG/DL
HCT VFR BLD CALC: 43 % (ref 40.5–52.5)
HDLC SERPL-MCNC: 65 MG/DL (ref 40–60)
HEMOGLOBIN: 14.5 G/DL (ref 13.5–17.5)
HYALINE CASTS: 0 /LPF (ref 0–8)
KETONES, URINE: NEGATIVE MG/DL
LDL CHOLESTEROL CALCULATED: 187 MG/DL
LEUKOCYTE ESTERASE, URINE: NEGATIVE
MCH RBC QN AUTO: 29.4 PG (ref 26–34)
MCHC RBC AUTO-ENTMCNC: 33.9 G/DL (ref 31–36)
MCV RBC AUTO: 86.8 FL (ref 80–100)
MICROSCOPIC EXAMINATION: YES
NITRITE, URINE: NEGATIVE
PDW BLD-RTO: 13.5 % (ref 12.4–15.4)
PH UA: 6.5
PLATELET # BLD: 274 K/UL (ref 135–450)
PMV BLD AUTO: 7.9 FL (ref 5–10.5)
POTASSIUM SERPL-SCNC: 4 MMOL/L (ref 3.5–5.1)
PROSTATE SPECIFIC ANTIGEN: 0.85 NG/ML (ref 0–4)
PROTEIN UA: 100 MG/DL
RBC # BLD: 4.95 M/UL (ref 4.2–5.9)
RBC UA: 2 /HPF (ref 0–4)
SODIUM BLD-SCNC: 141 MMOL/L (ref 136–145)
SPECIFIC GRAVITY UA: 1.02
T4 FREE: 1.1 NG/DL (ref 0.9–1.8)
TRIGLYCERIDE, FASTING: 114 MG/DL (ref 0–150)
TSH REFLEX: 1.32 UIU/ML (ref 0.27–4.2)
URINE TYPE: ABNORMAL
UROBILINOGEN, URINE: 0.2 E.U./DL
VLDLC SERPL CALC-MCNC: 23 MG/DL
WBC # BLD: 4.9 K/UL (ref 4–11)
WBC UA: 1 /HPF (ref 0–5)

## 2019-02-19 LAB
ESTIMATED AVERAGE GLUCOSE: 108.3 MG/DL
HBA1C MFR BLD: 5.4 %
URINE CULTURE, ROUTINE: NORMAL

## 2019-02-20 ENCOUNTER — TELEPHONE (OUTPATIENT)
Dept: INTERNAL MEDICINE CLINIC | Age: 49
End: 2019-02-20

## 2019-02-20 LAB — VITAMIN D 1,25-DIHYDROXY: 60.2 PG/ML (ref 19.9–79.3)

## 2019-02-20 RX ORDER — ATORVASTATIN CALCIUM 40 MG/1
40 TABLET, FILM COATED ORAL DAILY
Qty: 30 TABLET | Refills: 3 | Status: SHIPPED | OUTPATIENT
Start: 2019-02-20 | End: 2020-03-26 | Stop reason: SDUPTHER

## 2020-03-26 ENCOUNTER — VIRTUAL VISIT (OUTPATIENT)
Dept: INTERNAL MEDICINE CLINIC | Age: 50
End: 2020-03-26
Payer: COMMERCIAL

## 2020-03-26 ENCOUNTER — TELEPHONE (OUTPATIENT)
Dept: INTERNAL MEDICINE CLINIC | Age: 50
End: 2020-03-26

## 2020-03-26 PROCEDURE — 99442 PR PHYS/QHP TELEPHONE EVALUATION 11-20 MIN: CPT | Performed by: NURSE PRACTITIONER

## 2020-03-26 RX ORDER — ATORVASTATIN CALCIUM 40 MG/1
40 TABLET, FILM COATED ORAL DAILY
Qty: 30 TABLET | Refills: 2 | Status: SHIPPED | OUTPATIENT
Start: 2020-03-26 | End: 2020-06-18

## 2020-03-26 RX ORDER — LISINOPRIL 10 MG/1
10 TABLET ORAL DAILY
Qty: 30 TABLET | Refills: 0 | Status: CANCELLED | OUTPATIENT
Start: 2020-03-26

## 2020-03-26 RX ORDER — ASPIRIN 81 MG/1
81 TABLET ORAL DAILY
Qty: 30 TABLET | Refills: 2 | Status: SHIPPED | OUTPATIENT
Start: 2020-03-26 | End: 2020-06-18

## 2020-03-26 RX ORDER — LISINOPRIL 10 MG/1
10 TABLET ORAL DAILY
Qty: 30 TABLET | Refills: 2 | Status: SHIPPED | OUTPATIENT
Start: 2020-03-26 | End: 2020-06-18

## 2020-03-26 ASSESSMENT — ENCOUNTER SYMPTOMS
ABDOMINAL PAIN: 0
WHEEZING: 0
BACK PAIN: 0
SHORTNESS OF BREATH: 0
COLOR CHANGE: 0
SINUS PRESSURE: 0
SINUS PAIN: 0
DIARRHEA: 0
COUGH: 0
CONSTIPATION: 0

## 2020-03-26 NOTE — PROGRESS NOTES
Subjective:      Patient ID: Chris Izaguirre is a 52 y.o. male. Chief Complaint   Patient presents with    Follow-up     HPI  Here for follow up of his HTN and HLD. He has not been seen in over a year. He is out of his lisinopril. He has been monitoring BP at home on occasion and denies readings greater than 140/90. Denies CP, palpitations, SOB, or dizziness. He denies any acute concerns. Review of Systems   Constitutional: Negative for chills, fatigue and fever. HENT: Negative for congestion, sinus pressure and sinus pain. Respiratory: Negative for cough, shortness of breath and wheezing. Cardiovascular: Negative for chest pain and palpitations. Gastrointestinal: Negative for abdominal pain, constipation and diarrhea. Musculoskeletal: Negative for arthralgias, back pain and myalgias. Skin: Negative for color change, pallor and rash. Neurological: Negative for dizziness, syncope, weakness, light-headedness and headaches. Psychiatric/Behavioral: Negative for behavioral problems, confusion and sleep disturbance. The patient is not nervous/anxious. Objective:   Physical Exam  Constitutional:       Appearance: Normal appearance. HENT:      Head: Normocephalic and atraumatic. Eyes:      Extraocular Movements: Extraocular movements intact. Conjunctiva/sclera: Conjunctivae normal.   Pulmonary:      Effort: Pulmonary effort is normal.   Skin:     Coloration: Skin is not jaundiced or pale. Neurological:      Mental Status: He is alert. Psychiatric:         Mood and Affect: Mood normal.         Behavior: Behavior normal.         Thought Content:  Thought content normal.       Assessment:      See Problem List assessment and plan       Plan:       Essential hypertension  Stable, controlled   - Continue current medication regimen   - DASH diet encouraged   - Exercise encouraged     Hyperlipidemia  Stable, controlled   - Continue current medication regimen   - Recheck lipids   - Healthy diet such as mediterranean diet encouraged   - Exercise encouarged           Will plan on scheduling full physical once Covid-19 outbreak is resolved. Patient engaged in shared decision making. Information given to evaluate options of treatment, understand what is needed and discuss importance of following plan. Radhames Darnell is a 52 y.o. male evaluated via telephone on 3/26/2020. Consent:  He and/or health care decision maker is aware that that he may receive a bill for this telephone service, depending on his insurance coverage, and has provided verbal consent to proceed: Yes      Documentation:  I communicated with the patient and/or health care decision maker about HTN, HLD. Details of this discussion including any medical advice provided: See progress note. I affirm this is a Patient Initiated Episode with an Established Patient who has not had a related appointment within my department in the past 7 days or scheduled within the next 24 hours.     Total Time: minutes: 11-20 minutes    Note: not billable if this call serves to triage the patient into an appointment for the relevant concern      Hilario Cole

## 2020-03-26 NOTE — TELEPHONE ENCOUNTER
Patient requesting a medication refill. Medication aspirin (ECOTRIN LOW STRENGTH)  Dosage  81 MG EC tablet   Frequency Take 1 tablet by mouth daily  Last filled on 5/9/17  [id number] Rebecca Chen Darr[place] [facility] [phone]     Patient requesting a medication refill.   Medication lisinopril (PRINIVIL;ZESTRIL)   Dosage  10 MG tablet   FrequencyTake 10 mg by mouth daily  Last filled on 1/4/19  [facility] #[id number] - [facility] [facility] [phone] -  [phone]

## 2020-03-26 NOTE — ASSESSMENT & PLAN NOTE
Stable, controlled   - Continue current medication regimen   - Recheck lipids   - Healthy diet such as mediterranean diet encouraged   - Exercise encouarged

## 2020-03-27 NOTE — PROGRESS NOTES
3/26/2020    TELEHEALTH EVALUATION -- Audio/Visual (During [id number]-91 public health emergency)    HPI:    Shoaib Quiroz (:  1970) has requested an audio/video evaluation for the following concern(s):    Here for follow up of his HTN and HLD. He has not been seen in over a year. He is out of his lisinopril. He has been monitoring BP at home on occasion and denies readings greater than 140/90. Denies CP, palpitations, SOB, or dizziness. He denies any acute concerns. Review of Systems   Constitutional: Negative for chills, fatigue and fever. HENT: Negative for congestion, sinus pressure and sinus pain. Respiratory: Negative for cough, shortness of breath and wheezing. Cardiovascular: Negative for chest pain and palpitations. Gastrointestinal: Negative for abdominal pain, constipation and diarrhea. Musculoskeletal: Negative for arthralgias, back pain and myalgias. Skin: Negative for color change, pallor and rash. Neurological: Negative for dizziness, syncope, weakness, light-headedness and headaches. Psychiatric/Behavioral: Negative for behavioral problems, confusion and sleep disturbance. The patient is not nervous/anxious. Prior to Visit Medications    Medication Sig Taking? Authorizing Provider   lisinopril (PRINIVIL;ZESTRIL) 10 MG tablet Take 1 tablet by mouth daily Yes AMENA Nicole CNP   atorvastatin (LIPITOR) 40 MG tablet Take 1 tablet by mouth daily Yes AMENA Nicole CNP   aspirin EC 81 MG EC tablet Take 1 tablet by mouth daily Yes AMENA Nicole CNP   Fluocinonide 0.1 % CREA Apply sparingly to affected area(s) bid prn for flares, up to 2 weeks at a time on any given area. Do not apply on cleared skin.   Jennifer Amin MD       Social History     Tobacco Use    Smoking status: Never Smoker    Smokeless tobacco: Never Used   Substance Use Topics    Alcohol use: Yes     Comment: social    Drug use: No         PHYSICAL EXAMINATION:  [ INSTRUCTIONS:  \"[x]\" Indicates a positive item  \"[]\" Indicates a negative item  -- DELETE ALL ITEMS NOT EXAMINED]  Vital Signs: (As obtained by patient/caregiver or practitioner observation)    Blood pressure-  Heart rate-    Respiratory rate-    Temperature-  Pulse oximetry-     Constitutional: [x] Appears well-developed and well-nourished [] No apparent distress      [] Abnormal-   Mental status  [x] Alert and awake  [x] Oriented to person/place/time []Able to follow commands      Eyes:  EOM    [x]  Normal  [] Abnormal-  Sclera  [x]  Normal  [] Abnormal -         Discharge []  None visible  [] Abnormal -    HENT:   [x] Normocephalic, atraumatic. [] Abnormal   [] Mouth/Throat: Mucous membranes are moist.     External Ears [x] Normal  [] Abnormal-     Neck: [x] No visualized mass     Pulmonary/Chest: [x] Respiratory effort normal.  [] No visualized signs of difficulty breathing or respiratory distress        [] Abnormal-      Musculoskeletal:   [] Normal gait with no signs of ataxia         [x] Normal range of motion of neck        [] Abnormal-       Neurological:        [x] No Facial Asymmetry (Cranial nerve 7 motor function) (limited exam to video visit)          [x] No gaze palsy        [] Abnormal-         Skin:        [x] No significant exanthematous lesions or discoloration noted on facial skin         [] Abnormal-            Psychiatric:       [] Normal Affect [] No Hallucinations        [] Abnormal-     Other pertinent observable physical exam findings-     Due to this being a TeleHealth encounter, evaluation of the following organ systems is limited: Vitals/Constitutional/EENT/Resp/CV/GI//MS/Neuro/Skin/Heme-Lymph-Imm.     ASSESSMENT/PLAN:  Essential hypertension  Stable, controlled   - Continue current medication regimen   - DASH diet encouraged   - Exercise encouraged     Hyperlipidemia  Stable, controlled   - Continue current medication regimen   - Recheck lipids   - Healthy diet such as mediterranean diet encouraged   - Exercise encouarged         No follow-ups on file. An  electronic signature was used to authenticate this note. --AMENA Booth - CNP on 3/27/2020 at 9:40 AM        Pursuant to the emergency declaration under the [address] [place] [address], [facility] waiver authority and the [employer] Act, this Virtual  Visit was conducted, with patient's consent, to reduce the patient's risk of exposure to COVID-19 and provide continuity of care for an established patient. Services were provided through a video synchronous discussion virtually to substitute for in-person clinic visit.

## 2020-06-18 RX ORDER — ASPIRIN 81 MG/1
TABLET ORAL
Qty: 30 TABLET | Refills: 2 | Status: SHIPPED | OUTPATIENT
Start: 2020-06-18 | End: 2022-01-03 | Stop reason: SDUPTHER

## 2020-06-18 RX ORDER — ATORVASTATIN CALCIUM 40 MG/1
40 TABLET, FILM COATED ORAL DAILY
Qty: 30 TABLET | Refills: 2 | Status: SHIPPED | OUTPATIENT
Start: 2020-06-18 | End: 2022-01-03 | Stop reason: SDUPTHER

## 2020-06-18 RX ORDER — LISINOPRIL 10 MG/1
10 TABLET ORAL DAILY
Qty: 30 TABLET | Refills: 2 | Status: SHIPPED | OUTPATIENT
Start: 2020-06-18 | End: 2022-01-03 | Stop reason: SDUPTHER

## 2020-10-22 ENCOUNTER — TELEPHONE (OUTPATIENT)
Dept: INTERNAL MEDICINE CLINIC | Age: 50
End: 2020-10-22

## 2020-10-22 RX ORDER — SILODOSIN 4 MG/1
4 CAPSULE ORAL EVERY EVENING
Qty: 30 CAPSULE | Refills: 2 | Status: SHIPPED | OUTPATIENT
Start: 2020-10-22 | End: 2022-08-22

## 2020-10-22 NOTE — TELEPHONE ENCOUNTER
----- Message from Jeronimo Jett sent at 10/21/2020  5:18 PM EDT -----  Subject: Message to Provider    QUESTIONS  Information for Provider? PT needs refill for Prostate medication. The   medication is not listed in the [facility] medication records. The name of it is   Silidosin. PT has 0. PT used the last one last night. PT should take 1 a   day.   ---------------------------------------------------------------------------  --------------  CALL BACK INFO  What is the best way for the office to contact you? OK to leave message on   voicemail  Preferred Call Back Phone Number? [phone]  ---------------------------------------------------------------------------  --------------  SCRIPT ANSWERS  Relationship to Patient?  Self

## 2022-01-03 ENCOUNTER — TELEPHONE (OUTPATIENT)
Dept: INTERNAL MEDICINE CLINIC | Age: 52
End: 2022-01-03

## 2022-01-03 RX ORDER — ATORVASTATIN CALCIUM 40 MG/1
40 TABLET, FILM COATED ORAL DAILY
Qty: 30 TABLET | Refills: 2 | Status: SHIPPED | OUTPATIENT
Start: 2022-01-03 | End: 2022-03-31 | Stop reason: SDUPTHER

## 2022-01-03 RX ORDER — LISINOPRIL 10 MG/1
10 TABLET ORAL DAILY
Qty: 30 TABLET | Refills: 2 | Status: SHIPPED | OUTPATIENT
Start: 2022-01-03 | End: 2022-03-31 | Stop reason: SDUPTHER

## 2022-01-03 RX ORDER — ASPIRIN 81 MG/1
TABLET ORAL
Qty: 30 TABLET | Refills: 2 | Status: SHIPPED | OUTPATIENT
Start: 2022-01-03 | End: 2022-04-04 | Stop reason: SDUPTHER

## 2022-01-03 NOTE — TELEPHONE ENCOUNTER
----- Message from Rafael Bar sent at 1/3/2022  8:42 AM EST -----  Subject: Refill Request    QUESTIONS  Name of Medication? lisinopril (PRINIVIL;ZESTRIL) 10 MG tablet  Patient-reported dosage and instructions? 10 mg, once per day  How many days do you have left? 6  Preferred Pharmacy? [facility] #[id number]  Pharmacy phone number (if available)? [phone]  ---------------------------------------------------------------------------  --------------,  Name of Medication? atorvastatin (LIPITOR) 40 MG tablet  Patient-reported dosage and instructions? 40 mg, once per day  How many days do you have left? 6  Preferred Pharmacy? [facility] #[id number]  Pharmacy phone number (if available)? [phone]  ---------------------------------------------------------------------------  --------------,  Name of Medication? ASPIR-LOW 81 MG EC tablet  Patient-reported dosage and instructions? 81 mg, once per day  How many days do you have left? 6  Preferred Pharmacy? [facility] #[id number]  Pharmacy phone number (if available)? [phone]  ---------------------------------------------------------------------------  --------------  Jerel CARDOZA  What is the best way for the office to contact you? OK to leave message on   voicemail, OK to respond with electronic message via [employer] portal (only   for patients who have registered [employer] account)  Preferred Call Back Phone Number?  [phone]

## 2022-01-03 NOTE — TELEPHONE ENCOUNTER
----- Message from Devon Justin sent at 1/3/2022  8:42 AM EST -----  Subject: Refill Request    QUESTIONS  Name of Medication? lisinopril (PRINIVIL;ZESTRIL) 10 MG tablet  Patient-reported dosage and instructions? 10 mg, once per day  How many days do you have left? 6  Preferred Pharmacy? [facility]  #[id number]  Pharmacy phone number (if available)? [phone]  ---------------------------------------------------------------------------  --------------,  Name of Medication? atorvastatin (LIPITOR) 40 MG tablet  Patient-reported dosage and instructions? 40 mg, once per day  How many days do you have left? 6  Preferred Pharmacy? [facility] #[id number]  Pharmacy phone number (if available)? [phone]  ---------------------------------------------------------------------------  --------------,  Name of Medication? ASPIR-LOW 81 MG EC tablet  Patient-reported dosage and instructions? 81 mg, once per day  How many days do you have left? 6  Preferred Pharmacy? [facility] #[id number]  Pharmacy phone number (if available)? [phone]  ---------------------------------------------------------------------------  --------------  Audra CARDOZA  What is the best way for the office to contact you? OK to leave message on   voicemail, OK to respond with electronic message via [employer] portal (only   for patients who have registered [employer] account)  Preferred Call Back Phone Number?  [phone]

## 2022-01-05 ENCOUNTER — TELEPHONE (OUTPATIENT)
Dept: INTERNAL MEDICINE CLINIC | Age: 52
End: 2022-01-05

## 2022-01-05 DIAGNOSIS — N40.0 BENIGN PROSTATIC HYPERPLASIA WITHOUT LOWER URINARY TRACT SYMPTOMS: ICD-10-CM

## 2022-01-05 DIAGNOSIS — R73.01 IFG (IMPAIRED FASTING GLUCOSE): ICD-10-CM

## 2022-01-05 DIAGNOSIS — E55.9 VITAMIN D DEFICIENCY: ICD-10-CM

## 2022-01-05 DIAGNOSIS — E03.9 HYPOTHYROIDISM, UNSPECIFIED TYPE: ICD-10-CM

## 2022-01-05 DIAGNOSIS — Z00.00 ROUTINE GENERAL MEDICAL EXAMINATION AT A HEALTH CARE FACILITY: Primary | ICD-10-CM

## 2022-01-05 NOTE — TELEPHONE ENCOUNTER
Pt has a physical scheduled with Monica 1/17  He would like lab orders placed prior to that appt    He is having headaches to the point of nausea  He would like a referral to a neurologist

## 2022-01-07 DIAGNOSIS — Z00.00 ROUTINE GENERAL MEDICAL EXAMINATION AT A HEALTH CARE FACILITY: ICD-10-CM

## 2022-01-07 DIAGNOSIS — E55.9 VITAMIN D DEFICIENCY: ICD-10-CM

## 2022-01-07 DIAGNOSIS — R73.01 IFG (IMPAIRED FASTING GLUCOSE): ICD-10-CM

## 2022-01-07 DIAGNOSIS — E03.9 HYPOTHYROIDISM, UNSPECIFIED TYPE: ICD-10-CM

## 2022-01-07 DIAGNOSIS — N40.0 BENIGN PROSTATIC HYPERPLASIA WITHOUT LOWER URINARY TRACT SYMPTOMS: ICD-10-CM

## 2022-01-07 LAB
A/G RATIO: 1.5 (ref 1.1–2.2)
ALBUMIN SERPL-MCNC: 4.9 G/DL (ref 3.4–5)
ALP BLD-CCNC: 80 U/L (ref 40–129)
ALT SERPL-CCNC: 42 U/L (ref 10–40)
ANION GAP SERPL CALCULATED.3IONS-SCNC: 15 MMOL/L (ref 3–16)
AST SERPL-CCNC: 31 U/L (ref 15–37)
BILIRUB SERPL-MCNC: 0.4 MG/DL (ref 0–1)
BUN BLDV-MCNC: 14 MG/DL (ref 7–20)
CALCIUM SERPL-MCNC: 9.6 MG/DL (ref 8.3–10.6)
CHLORIDE BLD-SCNC: 100 MMOL/L (ref 99–110)
CHOLESTEROL, FASTING: 273 MG/DL (ref 0–199)
CO2: 27 MMOL/L (ref 21–32)
CREAT SERPL-MCNC: 0.9 MG/DL (ref 0.9–1.3)
GFR AFRICAN AMERICAN: >60
GFR NON-AFRICAN AMERICAN: >60
GLUCOSE BLD-MCNC: 87 MG/DL (ref 70–99)
HCT VFR BLD CALC: 49.9 % (ref 40.5–52.5)
HDLC SERPL-MCNC: 48 MG/DL (ref 40–60)
HEMOGLOBIN: 16.7 G/DL (ref 13.5–17.5)
LDL CHOLESTEROL CALCULATED: 202 MG/DL
MCH RBC QN AUTO: 28.8 PG (ref 26–34)
MCHC RBC AUTO-ENTMCNC: 33.4 G/DL (ref 31–36)
MCV RBC AUTO: 86.2 FL (ref 80–100)
PDW BLD-RTO: 13.3 % (ref 12.4–15.4)
PLATELET # BLD: 327 K/UL (ref 135–450)
PMV BLD AUTO: 7.5 FL (ref 5–10.5)
POTASSIUM SERPL-SCNC: 4.9 MMOL/L (ref 3.5–5.1)
PROSTATE SPECIFIC ANTIGEN: 0.96 NG/ML (ref 0–4)
RBC # BLD: 5.79 M/UL (ref 4.2–5.9)
SODIUM BLD-SCNC: 142 MMOL/L (ref 136–145)
TOTAL PROTEIN: 8.1 G/DL (ref 6.4–8.2)
TRIGLYCERIDE, FASTING: 115 MG/DL (ref 0–150)
TSH REFLEX: 1.08 UIU/ML (ref 0.27–4.2)
VITAMIN D 25-HYDROXY: 18.7 NG/ML
VLDLC SERPL CALC-MCNC: 23 MG/DL
WBC # BLD: 5.6 K/UL (ref 4–11)

## 2022-01-08 LAB
ESTIMATED AVERAGE GLUCOSE: 108.3 MG/DL
HBA1C MFR BLD: 5.4 %

## 2022-01-17 ENCOUNTER — OFFICE VISIT (OUTPATIENT)
Dept: INTERNAL MEDICINE CLINIC | Age: 52
End: 2022-01-17
Payer: COMMERCIAL

## 2022-01-17 VITALS
SYSTOLIC BLOOD PRESSURE: 138 MMHG | HEIGHT: 78 IN | HEART RATE: 87 BPM | BODY MASS INDEX: 29.04 KG/M2 | DIASTOLIC BLOOD PRESSURE: 86 MMHG | TEMPERATURE: 97.1 F | WEIGHT: 251 LBS | OXYGEN SATURATION: 98 %

## 2022-01-17 DIAGNOSIS — Z12.11 COLON CANCER SCREENING: Primary | ICD-10-CM

## 2022-01-17 DIAGNOSIS — E78.2 MIXED HYPERLIPIDEMIA: ICD-10-CM

## 2022-01-17 DIAGNOSIS — Z00.00 WELL ADULT EXAM: ICD-10-CM

## 2022-01-17 PROCEDURE — 99396 PREV VISIT EST AGE 40-64: CPT | Performed by: NURSE PRACTITIONER

## 2022-01-17 ASSESSMENT — ENCOUNTER SYMPTOMS
SORE THROAT: 0
EYE ITCHING: 0
BACK PAIN: 0
DIARRHEA: 0
ABDOMINAL PAIN: 0
SINUS PRESSURE: 0
CHEST TIGHTNESS: 0
CONSTIPATION: 0
VOMITING: 0
EYE REDNESS: 0
SHORTNESS OF BREATH: 0
BLOOD IN STOOL: 0
COLOR CHANGE: 0
COUGH: 0
NAUSEA: 0
WHEEZING: 0
RHINORRHEA: 0

## 2022-01-17 ASSESSMENT — PATIENT HEALTH QUESTIONNAIRE - PHQ9
SUM OF ALL RESPONSES TO PHQ QUESTIONS 1-9: 0
SUM OF ALL RESPONSES TO PHQ9 QUESTIONS 1 & 2: 0
SUM OF ALL RESPONSES TO PHQ QUESTIONS 1-9: 0
SUM OF ALL RESPONSES TO PHQ9 QUESTIONS 1 & 2: 0
SUM OF ALL RESPONSES TO PHQ QUESTIONS 1-9: 0
1. LITTLE INTEREST OR PLEASURE IN DOING THINGS: 0
2. FEELING DOWN, DEPRESSED OR HOPELESS: 0
SUM OF ALL RESPONSES TO PHQ QUESTIONS 1-9: 0
2. FEELING DOWN, DEPRESSED OR HOPELESS: 0
SUM OF ALL RESPONSES TO PHQ QUESTIONS 1-9: 0
1. LITTLE INTEREST OR PLEASURE IN DOING THINGS: 0

## 2022-01-17 NOTE — PROGRESS NOTES
Subjective:     CC:  Annual Exam      HPI:  Lionel Serrato is here for a comprehensive physical exam.  He is doing well overall. He states that over the past year he has not been taking his cholesterol medicine and recently restarted back at the gym and increasing his physical activity. He states he is starting to make the appropriate dietary changes to hopefully have a better lipid panel. He states he is taking his blood pressure medicine without any issues and continues to take his baby aspirin. He denies any concerns currently today. He was previously having headaches about a month ago but believes this was a viral illness that has resolved as he has no longer having any headaches. He did check if it was COVID and they were negative. He states he would like to have his colonoscopy referral as requested. He does not wish to vaccinate against COVID, flu, or other vaccines due today.     Vitals:    01/17/22 1448   BP: 138/86   Pulse: 87   Temp: 97.1 °F (36.2 °C)   SpO2: 98%       Wt Readings from Last 3 Encounters:   01/17/22 251 lb (113.9 kg)   01/04/19 230 lb (104.3 kg)   06/13/18 214 lb (97.1 kg)       Past Medical History:   Diagnosis Date    Arteriosclerotic coronary artery disease 1/25/2017    Benign prostatic hypertrophy 1/13/2012    Elevated blood-pressure reading without diagnosis of hypertension 3/4/2012    Essential hypertension 2/15/2016    Gastroesophageal reflux disease without esophagitis 7/31/2016    GERD (gastroesophageal reflux disease) 12/5/2011    Hyperlipidemia 7/19/2015    Hypertension 3/6/2012    Microscopic hematuria 12/7/2011       Past Surgical History:   Procedure Laterality Date    HAMMER TOE SURGERY  2008    left foot - Dr. Mabry Fail      left foot    HAMMER TOE SURGERY      left foot    LAPAROSCOPIC APPENDECTOMY  March 4, 2014    Dr. Joce Luke  March 9, 2012    L3-L4 laminectomy with microdiscectomy and facetectomy - Dr. Garrett Cook  about 2010    [facility] and [address] [facility] office   [address]    right wrist    WRIST GANGLION EXCISION  1989    right wrist       Family History   Problem Relation Age of Onset    Other Mother         GERD    Cancer Mother 61        breast cancer    Other Sister         GERD    High Blood Pressure Father     Hypertension Father     Diabetes Sister 43    No Known Problems Daughter     No Known Problems Son        Social History     Tobacco Use    Smoking status: Never Smoker    Smokeless tobacco: Never Used   Vaping Use    Vaping Use: Never used   Substance Use Topics    Alcohol use: Yes     Comment: social    Drug use: No         There is no immunization history on file for this patient. Health Maintenance   Topic Date Due    COVID-19 Vaccine (1) Never done    Depression Screen  Never done    HIV screen  Never done    DTaP/Tdap/Td vaccine (1 - Tdap) Never done    Colon cancer screen colonoscopy  Never done    Shingles Vaccine (1 of 2) Never done    Flu vaccine (1) Never done    Lipid screen  01/07/2023    Potassium monitoring  01/07/2023    Creatinine monitoring  01/07/2023    Hepatitis C screen  Completed    Hepatitis A vaccine  Aged Out    Hepatitis B vaccine  Aged Out    Hib vaccine  Aged Out    Meningococcal (ACWY) vaccine  Aged Out    Pneumococcal 0-64 years Vaccine  Aged Out       Review of Systems   Constitutional: Negative for chills, fatigue and fever. HENT: Negative for congestion, ear pain, postnasal drip, rhinorrhea, sinus pressure, sneezing and sore throat. Eyes: Negative for redness and itching. Respiratory: Negative for cough, chest tightness, shortness of breath and wheezing. Cardiovascular: Negative for chest pain and palpitations. Gastrointestinal: Negative for abdominal pain, blood in stool, constipation, diarrhea, nausea and vomiting. Endocrine: Negative for cold intolerance and heat intolerance. Genitourinary: Negative for difficulty urinating, dysuria, flank pain, frequency, hematuria and urgency. Musculoskeletal: Negative for arthralgias, back pain, joint swelling and myalgias. Skin: Negative for color change, pallor, rash and wound. Allergic/Immunologic: Negative for environmental allergies and food allergies. Neurological: Negative for dizziness, seizures, syncope, weakness, light-headedness, numbness and headaches. Hematological: Negative for adenopathy. Does not bruise/bleed easily. Psychiatric/Behavioral: Negative for confusion, sleep disturbance and suicidal ideas. The patient is not nervous/anxious and is not hyperactive. Objective:     Physical Exam  Constitutional:       Appearance: Normal appearance. He is normal weight. HENT:      Head: Normocephalic and atraumatic. Right Ear: Tympanic membrane, ear canal and external ear normal.      Left Ear: Tympanic membrane, ear canal and external ear normal.      Nose: Nose normal.      Mouth/Throat:      Mouth: Mucous membranes are dry. Eyes:      Extraocular Movements: Extraocular movements intact. Conjunctiva/sclera: Conjunctivae normal.      Pupils: Pupils are equal, round, and reactive to light. Cardiovascular:      Rate and Rhythm: Normal rate and regular rhythm. Pulses: Normal pulses. Heart sounds: Normal heart sounds. Pulmonary:      Effort: Pulmonary effort is normal.      Breath sounds: Normal breath sounds. Abdominal:      General: Abdomen is flat. Bowel sounds are normal.      Palpations: Abdomen is soft. Tenderness: There is no abdominal tenderness. Musculoskeletal:         General: Normal range of motion. Cervical back: Normal range of motion and neck supple. Skin:     General: Skin is warm and dry. Neurological:      General: No focal deficit present.       Mental Status: He is alert and oriented to person, place, and time. Psychiatric:         Mood and Affect: Mood normal.         Behavior: Behavior normal.         Assessment:      See ProblemList assessment and plan       [facility] 1/17/2022 1/17/2022 1/4/2019 6/13/2018 4/19/2017   PHQ2 Score 0 0 0 0 0   PHQ9 Score 0 0 0 0 0     Interpretation of Total Score Depression Severity: 1-4 = Minimal depression, 5-9 = Milddepression, 10-14 = Moderate depression, 15-19 = Moderately severe depression, 20-27 = Severe depression    Plan:      Well adult exam   Well exam in office today, fasting labs reviewed. Discussed colonoscopy referral has been placed. Discussed vaccines and recommendations, patient verbalized understanding. Hyperlipidemia   Most recent lipid panel does show elevated LDL and total fasting cholesterol overall with a low HDL. Patient has not been taking his Lipitor as prescribed but has recently restarted. He also has increased his physical activity recently and is making dietary changes. He does know that he should have his labs rechecked in 6 months and these have been ordered along with a follow-up appointment. Discussed over the counter medication with patient. Shanique Tri received counseling on the following healthybehaviors: nutrition, exercise, and medication adherence    Patient given educational materials on their chronic medical conditions    Discussed use, benefit, and side effects of prescribed medications. Barriersto medication compliance addressed. All patient questions answered. Patient voiced understanding. Medications reviewed and patient understands.   Questions answered

## 2022-01-17 NOTE — ASSESSMENT & PLAN NOTE
Most recent lipid panel does show elevated LDL and total fasting cholesterol overall with a low HDL. Patient has not been taking his Lipitor as prescribed but has recently restarted. He also has increased his physical activity recently and is making dietary changes. He does know that he should have his labs rechecked in 6 months and these have been ordered along with a follow-up appointment.

## 2022-01-17 NOTE — ASSESSMENT & PLAN NOTE
Well exam in office today, fasting labs reviewed. Discussed colonoscopy referral has been placed. Discussed vaccines and recommendations, patient verbalized understanding.

## 2022-03-31 RX ORDER — LISINOPRIL 10 MG/1
10 TABLET ORAL DAILY
Qty: 90 TABLET | Refills: 1 | Status: SHIPPED | OUTPATIENT
Start: 2022-03-31 | End: 2022-10-19 | Stop reason: SDUPTHER

## 2022-03-31 RX ORDER — ATORVASTATIN CALCIUM 40 MG/1
40 TABLET, FILM COATED ORAL DAILY
Qty: 90 TABLET | Refills: 1 | Status: ON HOLD | OUTPATIENT
Start: 2022-03-31 | End: 2022-08-26 | Stop reason: HOSPADM

## 2022-04-04 RX ORDER — ASPIRIN 81 MG/1
TABLET ORAL
Qty: 90 TABLET | Refills: 1 | Status: SHIPPED | OUTPATIENT
Start: 2022-04-04 | End: 2022-08-22

## 2022-04-14 NOTE — TELEPHONE ENCOUNTER
ANTICOAGULATION     Hunter Mendez III is overdue for INR check.      Mychart sent    Lexus Blanton RN     Confirmed with patient, he wants Rx sent to [place].   He does not want it free at [facility]

## 2022-05-11 ENCOUNTER — TELEPHONE (OUTPATIENT)
Dept: SURGERY | Age: 52
End: 2022-05-11

## 2022-05-12 RX ORDER — POLYETHYLENE GLYCOL 3350, SODIUM SULFATE ANHYDROUS, SODIUM BICARBONATE, SODIUM CHLORIDE, POTASSIUM CHLORIDE 236; 22.74; 6.74; 5.86; 2.97 G/4L; G/4L; G/4L; G/4L; G/4L
POWDER, FOR SOLUTION ORAL
Qty: 1 EACH | Refills: 0 | Status: SHIPPED | OUTPATIENT
Start: 2022-05-12 | End: 2022-08-22

## 2022-05-12 NOTE — TELEPHONE ENCOUNTER
Patient has been scheduled for:    Procedure: Colonoscopy   Date:  6/22/2022  Time: 10:00 AM  Arrival: 8:30 AM   Hospital:    Covid: no  ASA?: no  Prep? Golytely, declined on phone review     Pre-op? Day of by Dr. Lilia Sanches? N/A     Patient advised they will need a . Orders faxed to surgery scheduling. Instructions have been emailed to: [email or web address]

## 2022-06-16 ENCOUNTER — TELEPHONE (OUTPATIENT)
Dept: SURGERY | Age: 52
End: 2022-06-16

## 2022-06-16 NOTE — TELEPHONE ENCOUNTER
Pt called needing to rescheduled 6/22/22 colonoscopy. He is changing insurance and will not be effective until July 1. Pt was rescheduled to 8/3/22. Faxed scheduled change and updated [place] calendar.

## 2022-07-05 ENCOUNTER — OFFICE VISIT (OUTPATIENT)
Dept: FAMILY MEDICINE CLINIC | Age: 52
End: 2022-07-05
Payer: COMMERCIAL

## 2022-07-05 VITALS
OXYGEN SATURATION: 99 % | HEART RATE: 72 BPM | SYSTOLIC BLOOD PRESSURE: 118 MMHG | DIASTOLIC BLOOD PRESSURE: 82 MMHG | HEIGHT: 78 IN | BODY MASS INDEX: 26.61 KG/M2 | WEIGHT: 230 LBS | TEMPERATURE: 97 F

## 2022-07-05 DIAGNOSIS — B35.1 TOENAIL FUNGUS: ICD-10-CM

## 2022-07-05 DIAGNOSIS — L40.9 PSORIASIS: ICD-10-CM

## 2022-07-05 PROCEDURE — 99214 OFFICE O/P EST MOD 30 MIN: CPT | Performed by: NURSE PRACTITIONER

## 2022-07-05 RX ORDER — TERBINAFINE HYDROCHLORIDE 250 MG/1
250 TABLET ORAL DAILY
Qty: 84 TABLET | Refills: 0 | Status: ON HOLD | OUTPATIENT
Start: 2022-07-05 | End: 2022-08-26 | Stop reason: HOSPADM

## 2022-07-05 SDOH — ECONOMIC STABILITY: FOOD INSECURITY: WITHIN THE PAST 12 MONTHS, YOU WORRIED THAT YOUR FOOD WOULD RUN OUT BEFORE YOU GOT MONEY TO BUY MORE.: NEVER TRUE

## 2022-07-05 SDOH — ECONOMIC STABILITY: FOOD INSECURITY: WITHIN THE PAST 12 MONTHS, THE FOOD YOU BOUGHT JUST DIDN'T LAST AND YOU DIDN'T HAVE MONEY TO GET MORE.: NEVER TRUE

## 2022-07-05 ASSESSMENT — ENCOUNTER SYMPTOMS
SINUS PAIN: 0
SHORTNESS OF BREATH: 0
WHEEZING: 0
ABDOMINAL PAIN: 0
CONSTIPATION: 0
SINUS PRESSURE: 0
BACK PAIN: 0
COLOR CHANGE: 0
DIARRHEA: 0
ROS SKIN COMMENTS: TOE NAIL DISCOLORATION
COUGH: 0

## 2022-07-05 ASSESSMENT — SOCIAL DETERMINANTS OF HEALTH (SDOH): HOW HARD IS IT FOR YOU TO PAY FOR THE VERY BASICS LIKE FOOD, HOUSING, MEDICAL CARE, AND HEATING?: NOT HARD AT ALL

## 2022-07-05 NOTE — PROGRESS NOTES
Oralia Uribe (:  1970) is a 46 y.o. male,Established patient, here for evaluation of the following chief complaint(s):  Nail Problem (possible fungus in first toe on left foot, has been present for \"some years\") and Psoriasis (would like medication to help with this, had been diagnosed before, is currently on cheeks but has been other places before.)      ASSESSMENT/PLAN:  1. Psoriasis  Assessment & Plan:  Symptoms mild  Triamcinolone cream x7 days then as needed  2. Toenail fungus  Assessment & Plan:  No response with OTC treatment  Start oral Lamisil x12-week  Reviewed recent LFTs      No follow-ups on file. SUBJECTIVE/OBJECTIVE:  HPI   Patient is here for concern of skin rash to face, bilateral cheeks that has been ongoing for the past several weeks. He does have a history of psoriasis. He has been using triamcinolone cream that his daughter uses for her psoriasis. He has responded well to this treatment so far. He does need a prescription for this. He also reports toenail fungus to the left great toe. He has been using OTC antifungal topicals without much relief. Has been ongoing for some time. Current Outpatient Medications   Medication Sig Dispense Refill    triamcinolone (KENALOG) 0.1 % ointment Apply topically 2 times daily for 7 days 80 g 1    terbinafine (LAMISIL) 250 MG tablet Take 1 tablet by mouth daily 84 tablet 0    polyethylene glycol (GOLYTELY) 236 g solution Prepare solution according to packaging instructions. 1 each 0    aspirin (ASPIR-LOW) 81 MG EC tablet TAKE 1 TABLET BY MOUTH DAILY 90 tablet 1    atorvastatin (LIPITOR) 40 MG tablet Take 1 tablet by mouth daily 90 tablet 1    lisinopril (PRINIVIL;ZESTRIL) 10 MG tablet Take 1 tablet by mouth daily 90 tablet 1    RAPAFLO 4 MG CAPS capsule Take 1 capsule by mouth every evening (Patient not taking: Reported on 2022) 30 capsule 2     No current facility-administered medications for this visit.      Review of Systems   Constitutional: Negative for chills, fatigue and fever. HENT: Negative for congestion, sinus pressure and sinus pain. Respiratory: Negative for cough, shortness of breath and wheezing. Cardiovascular: Negative for chest pain and palpitations. Gastrointestinal: Negative for abdominal pain, constipation and diarrhea. Musculoskeletal: Negative for arthralgias, back pain and myalgias. Skin: Positive for rash (face). Negative for color change and pallor. Toe nail discoloration   Neurological: Negative for dizziness, syncope, weakness, light-headedness and headaches. Psychiatric/Behavioral: Negative for behavioral problems, confusion and sleep disturbance. The patient is not nervous/anxious. Vitals:    07/05/22 1513   BP: 118/82   Site: Right Upper Arm   Position: Sitting   Cuff Size: Large Adult   Pulse: 72   Temp: 97 °F (36.1 °C)   SpO2: 99%   Weight: 230 lb (104.3 kg)   Height: 6' 8\" (2.032 m)       Physical Exam  Constitutional:       Appearance: He is well-developed. HENT:      Head: Normocephalic and atraumatic. Eyes:      Conjunctiva/sclera: Conjunctivae normal.      Pupils: Pupils are equal, round, and reactive to light. Neck:      Thyroid: No thyromegaly. Vascular: No JVD. Cardiovascular:      Rate and Rhythm: Normal rate and regular rhythm. Heart sounds: Normal heart sounds. Pulmonary:      Effort: Pulmonary effort is normal. No respiratory distress. Breath sounds: Normal breath sounds. No wheezing. Musculoskeletal:         General: No deformity. Normal range of motion. Cervical back: Normal range of motion and neck supple. Feet:      Left foot:      Toenail Condition: Left toenails are abnormally thick. Fungal disease present. Skin:     General: Skin is warm and dry. Capillary Refill: Capillary refill takes less than 2 seconds. Findings: Rash present. Rash is scaling.       Comments: Face, mild   Neurological:      Mental Status: He is alert and oriented to person, place, and time. Psychiatric:         Behavior: Behavior normal.           An electronic signature was used to authenticate this note.     --Kole Hill, AMENA - CNP

## 2022-07-07 ENCOUNTER — TELEPHONE (OUTPATIENT)
Dept: FAMILY MEDICINE CLINIC | Age: 52
End: 2022-07-07

## 2022-07-07 RX ORDER — WATER / MINERAL OIL / WHITE PETROLATUM 16 OZ
CREAM TOPICAL
Qty: 454 G | Refills: 0 | Status: SHIPPED | OUTPATIENT
Start: 2022-07-07

## 2022-07-07 NOTE — TELEPHONE ENCOUNTER
Cannot find this combination ointment in his chart. Unable to find order for this. Sent in prescription for Eucerin. He can use this daily. Use the triamcinolone for 7 days during flares.

## 2022-07-07 NOTE — TELEPHONE ENCOUNTER
Pt called and stated that he is requesting a cream that has triamcinolone and eucerine in it together as a compound cream.    He has never had this before. He just has had the triamcinolone 1% cream from us for his psoriasis.     [facility] [address] [facility] [address] [place], [address] -  [phone]    LOV: 7/5/22

## 2022-07-07 NOTE — TELEPHONE ENCOUNTER
Called pt and explained that he had triamcinolone and eucerin mixture. He didn't know if this is something that the pharmacy mixed up themselves ,but the label said triamcinolone/eucerin cream. He has psorasis on his face that why he is wanting this mixture.

## 2022-08-01 ENCOUNTER — TELEPHONE (OUTPATIENT)
Dept: SURGERY | Age: 52
End: 2022-08-01

## 2022-08-01 NOTE — TELEPHONE ENCOUNTER
Pt called needing to reschedule colonoscopy scheduled om 8/3/22. He has to work. Pt was rescheduled to 9/21/22. Revised letter was sent and [place] calendar was updated.

## 2022-08-22 ENCOUNTER — APPOINTMENT (OUTPATIENT)
Dept: CT IMAGING | Age: 52
DRG: 871 | End: 2022-08-22
Payer: COMMERCIAL

## 2022-08-22 ENCOUNTER — HOSPITAL ENCOUNTER (INPATIENT)
Age: 52
LOS: 3 days | Discharge: HOME | DRG: 871 | End: 2022-08-26
Attending: EMERGENCY MEDICINE | Admitting: INTERNAL MEDICINE
Payer: COMMERCIAL

## 2022-08-22 ENCOUNTER — APPOINTMENT (OUTPATIENT)
Dept: GENERAL RADIOLOGY | Age: 52
DRG: 871 | End: 2022-08-22
Payer: COMMERCIAL

## 2022-08-22 ENCOUNTER — OFFICE VISIT (OUTPATIENT)
Dept: FAMILY MEDICINE CLINIC | Age: 52
End: 2022-08-22
Payer: COMMERCIAL

## 2022-08-22 VITALS
HEART RATE: 82 BPM | DIASTOLIC BLOOD PRESSURE: 84 MMHG | TEMPERATURE: 98.6 F | SYSTOLIC BLOOD PRESSURE: 148 MMHG | OXYGEN SATURATION: 92 %

## 2022-08-22 DIAGNOSIS — J18.9 PNEUMONIA OF RIGHT LOWER LOBE DUE TO INFECTIOUS ORGANISM: ICD-10-CM

## 2022-08-22 DIAGNOSIS — R50.81 FEVER IN OTHER DISEASES: Primary | ICD-10-CM

## 2022-08-22 DIAGNOSIS — R53.1 GENERALIZED WEAKNESS: ICD-10-CM

## 2022-08-22 DIAGNOSIS — K52.9 ENTERITIS: ICD-10-CM

## 2022-08-22 PROBLEM — E87.1 HYPONATREMIA: Status: ACTIVE | Noted: 2022-08-22

## 2022-08-22 PROBLEM — R74.01 TRANSAMINITIS: Status: ACTIVE | Noted: 2022-08-22

## 2022-08-22 PROBLEM — B34.9 ACUTE VIRAL SYNDROME: Status: ACTIVE | Noted: 2022-08-22

## 2022-08-22 LAB
A/G RATIO: 0.9 (ref 1.1–2.2)
ALBUMIN SERPL-MCNC: 3.9 G/DL (ref 3.4–5)
ALBUMIN SERPL-MCNC: 3.9 G/DL (ref 3.4–5)
ALP BLD-CCNC: 80 U/L (ref 40–129)
ALP BLD-CCNC: 81 U/L (ref 40–129)
ALT SERPL-CCNC: 291 U/L (ref 10–40)
ALT SERPL-CCNC: 298 U/L (ref 10–40)
ANION GAP SERPL CALCULATED.3IONS-SCNC: 14 MMOL/L (ref 3–16)
AST SERPL-CCNC: 360 U/L (ref 15–37)
AST SERPL-CCNC: 382 U/L (ref 15–37)
BACTERIA: ABNORMAL /HPF
BASOPHILS ABSOLUTE: 0.1 K/UL (ref 0–0.2)
BASOPHILS RELATIVE PERCENT: 0.9 %
BILIRUB SERPL-MCNC: 0.7 MG/DL (ref 0–1)
BILIRUB SERPL-MCNC: 0.7 MG/DL (ref 0–1)
BILIRUBIN DIRECT: <0.2 MG/DL (ref 0–0.3)
BILIRUBIN URINE: ABNORMAL
BILIRUBIN, INDIRECT: ABNORMAL MG/DL (ref 0–1)
BLOOD, URINE: ABNORMAL
BUN BLDV-MCNC: 18 MG/DL (ref 7–20)
CALCIUM SERPL-MCNC: 9.5 MG/DL (ref 8.3–10.6)
CHLORIDE BLD-SCNC: 93 MMOL/L (ref 99–110)
CLARITY: CLEAR
CO2: 23 MMOL/L (ref 21–32)
COARSE CASTS, UA: ABNORMAL /LPF (ref 0–2)
COLOR: YELLOW
CREAT SERPL-MCNC: 1.2 MG/DL (ref 0.9–1.3)
EOSINOPHILS ABSOLUTE: 0 K/UL (ref 0–0.6)
EOSINOPHILS RELATIVE PERCENT: 0 %
GFR AFRICAN AMERICAN: >60
GFR NON-AFRICAN AMERICAN: >60
GLUCOSE BLD-MCNC: 127 MG/DL (ref 70–99)
GLUCOSE URINE: NEGATIVE MG/DL
HCT VFR BLD CALC: 42.6 % (ref 40.5–52.5)
HEMOGLOBIN: 14.4 G/DL (ref 13.5–17.5)
HYALINE CASTS: ABNORMAL /LPF (ref 0–2)
KETONES, URINE: NEGATIVE MG/DL
LACTIC ACID: 1.7 MMOL/L (ref 0.4–2)
LEUKOCYTE ESTERASE, URINE: NEGATIVE
LYMPHOCYTES ABSOLUTE: 1.2 K/UL (ref 1–5.1)
LYMPHOCYTES RELATIVE PERCENT: 12.9 %
MAGNESIUM: 2.2 MG/DL (ref 1.8–2.4)
MCH RBC QN AUTO: 28.9 PG (ref 26–34)
MCHC RBC AUTO-ENTMCNC: 33.8 G/DL (ref 31–36)
MCV RBC AUTO: 85.6 FL (ref 80–100)
MICROSCOPIC EXAMINATION: YES
MONO TEST: NEGATIVE
MONOCYTES ABSOLUTE: 0.5 K/UL (ref 0–1.3)
MONOCYTES RELATIVE PERCENT: 5.3 %
MUCUS: ABNORMAL /LPF
NEUTROPHILS ABSOLUTE: 7.5 K/UL (ref 1.7–7.7)
NEUTROPHILS RELATIVE PERCENT: 80.9 %
NITRITE, URINE: NEGATIVE
PDW BLD-RTO: 13.7 % (ref 12.4–15.4)
PH UA: 6 (ref 5–8)
PLATELET # BLD: 187 K/UL (ref 135–450)
PMV BLD AUTO: 8 FL (ref 5–10.5)
POTASSIUM SERPL-SCNC: 4.7 MMOL/L (ref 3.5–5.1)
PROTEIN UA: >=300 MG/DL
RAPID INFLUENZA  B AGN: NEGATIVE
RAPID INFLUENZA A AGN: NEGATIVE
RBC # BLD: 4.98 M/UL (ref 4.2–5.9)
RBC UA: ABNORMAL /HPF (ref 0–4)
RENAL EPITHELIAL, UA: ABNORMAL /HPF (ref 0–1)
SEDIMENTATION RATE, ERYTHROCYTE: 88 MM/HR (ref 0–20)
SODIUM BLD-SCNC: 130 MMOL/L (ref 136–145)
SPECIFIC GRAVITY UA: >=1.03 (ref 1–1.03)
TOTAL CK: 345 U/L (ref 39–308)
TOTAL PROTEIN: 8.1 G/DL (ref 6.4–8.2)
TOTAL PROTEIN: 8.4 G/DL (ref 6.4–8.2)
URINE REFLEX TO CULTURE: ABNORMAL
URINE TYPE: ABNORMAL
UROBILINOGEN, URINE: 0.2 E.U./DL
WBC # BLD: 9.3 K/UL (ref 4–11)
WBC UA: ABNORMAL /HPF (ref 0–5)

## 2022-08-22 PROCEDURE — 6370000000 HC RX 637 (ALT 250 FOR IP): Performed by: STUDENT IN AN ORGANIZED HEALTH CARE EDUCATION/TRAINING PROGRAM

## 2022-08-22 PROCEDURE — 85652 RBC SED RATE AUTOMATED: CPT

## 2022-08-22 PROCEDURE — 2580000003 HC RX 258: Performed by: INTERNAL MEDICINE

## 2022-08-22 PROCEDURE — 93000 ELECTROCARDIOGRAM COMPLETE: CPT | Performed by: NURSE PRACTITIONER

## 2022-08-22 PROCEDURE — 80053 COMPREHEN METABOLIC PANEL: CPT

## 2022-08-22 PROCEDURE — 99215 OFFICE O/P EST HI 40 MIN: CPT | Performed by: NURSE PRACTITIONER

## 2022-08-22 PROCEDURE — 96361 HYDRATE IV INFUSION ADD-ON: CPT

## 2022-08-22 PROCEDURE — 6360000004 HC RX CONTRAST MEDICATION: Performed by: STUDENT IN AN ORGANIZED HEALTH CARE EDUCATION/TRAINING PROGRAM

## 2022-08-22 PROCEDURE — 87804 INFLUENZA ASSAY W/OPTIC: CPT

## 2022-08-22 PROCEDURE — G0378 HOSPITAL OBSERVATION PER HR: HCPCS

## 2022-08-22 PROCEDURE — 99285 EMERGENCY DEPT VISIT HI MDM: CPT

## 2022-08-22 PROCEDURE — 87040 BLOOD CULTURE FOR BACTERIA: CPT

## 2022-08-22 PROCEDURE — 6370000000 HC RX 637 (ALT 250 FOR IP): Performed by: INTERNAL MEDICINE

## 2022-08-22 PROCEDURE — 2580000003 HC RX 258: Performed by: STUDENT IN AN ORGANIZED HEALTH CARE EDUCATION/TRAINING PROGRAM

## 2022-08-22 PROCEDURE — 83605 ASSAY OF LACTIC ACID: CPT

## 2022-08-22 PROCEDURE — 82550 ASSAY OF CK (CPK): CPT

## 2022-08-22 PROCEDURE — 82607 VITAMIN B-12: CPT

## 2022-08-22 PROCEDURE — 85025 COMPLETE CBC W/AUTO DIFF WBC: CPT

## 2022-08-22 PROCEDURE — 83735 ASSAY OF MAGNESIUM: CPT

## 2022-08-22 PROCEDURE — 74177 CT ABD & PELVIS W/CONTRAST: CPT

## 2022-08-22 PROCEDURE — 86308 HETEROPHILE ANTIBODY SCREEN: CPT

## 2022-08-22 PROCEDURE — 96360 HYDRATION IV INFUSION INIT: CPT

## 2022-08-22 PROCEDURE — 71045 X-RAY EXAM CHEST 1 VIEW: CPT

## 2022-08-22 PROCEDURE — 81001 URINALYSIS AUTO W/SCOPE: CPT

## 2022-08-22 RX ORDER — ONDANSETRON 2 MG/ML
4 INJECTION INTRAMUSCULAR; INTRAVENOUS EVERY 6 HOURS PRN
Status: DISCONTINUED | OUTPATIENT
Start: 2022-08-22 | End: 2022-08-26 | Stop reason: HOSPADM

## 2022-08-22 RX ORDER — M-VIT,TX,IRON,MINS/CALC/FOLIC 27MG-0.4MG
1 TABLET ORAL DAILY
COMMUNITY

## 2022-08-22 RX ORDER — ACETAMINOPHEN 650 MG/1
650 SUPPOSITORY RECTAL EVERY 6 HOURS PRN
Status: DISCONTINUED | OUTPATIENT
Start: 2022-08-22 | End: 2022-08-23

## 2022-08-22 RX ORDER — SODIUM CHLORIDE 0.9 % (FLUSH) 0.9 %
5-40 SYRINGE (ML) INJECTION EVERY 12 HOURS SCHEDULED
Status: DISCONTINUED | OUTPATIENT
Start: 2022-08-22 | End: 2022-08-26 | Stop reason: HOSPADM

## 2022-08-22 RX ORDER — AMOXICILLIN AND CLAVULANATE POTASSIUM 875; 125 MG/1; MG/1
1 TABLET, FILM COATED ORAL ONCE
Status: COMPLETED | OUTPATIENT
Start: 2022-08-22 | End: 2022-08-22

## 2022-08-22 RX ORDER — ONDANSETRON 4 MG/1
4 TABLET, ORALLY DISINTEGRATING ORAL EVERY 8 HOURS PRN
Status: DISCONTINUED | OUTPATIENT
Start: 2022-08-22 | End: 2022-08-26 | Stop reason: HOSPADM

## 2022-08-22 RX ORDER — ACETAMINOPHEN 325 MG/1
650 TABLET ORAL ONCE
Status: COMPLETED | OUTPATIENT
Start: 2022-08-22 | End: 2022-08-22

## 2022-08-22 RX ORDER — MAGNESIUM HYDROXIDE/ALUMINUM HYDROXICE/SIMETHICONE 120; 1200; 1200 MG/30ML; MG/30ML; MG/30ML
30 SUSPENSION ORAL EVERY 6 HOURS PRN
Status: DISCONTINUED | OUTPATIENT
Start: 2022-08-22 | End: 2022-08-26 | Stop reason: HOSPADM

## 2022-08-22 RX ORDER — ENOXAPARIN SODIUM 100 MG/ML
40 INJECTION SUBCUTANEOUS DAILY
Status: DISCONTINUED | OUTPATIENT
Start: 2022-08-22 | End: 2022-08-26 | Stop reason: HOSPADM

## 2022-08-22 RX ORDER — FAMOTIDINE 20 MG/1
20 TABLET, FILM COATED ORAL 2 TIMES DAILY
Status: DISCONTINUED | OUTPATIENT
Start: 2022-08-22 | End: 2022-08-26 | Stop reason: HOSPADM

## 2022-08-22 RX ORDER — SODIUM CHLORIDE 9 MG/ML
INJECTION, SOLUTION INTRAVENOUS CONTINUOUS
Status: ACTIVE | OUTPATIENT
Start: 2022-08-22 | End: 2022-08-23

## 2022-08-22 RX ORDER — SODIUM CHLORIDE, SODIUM LACTATE, POTASSIUM CHLORIDE, AND CALCIUM CHLORIDE .6; .31; .03; .02 G/100ML; G/100ML; G/100ML; G/100ML
1000 INJECTION, SOLUTION INTRAVENOUS ONCE
Status: COMPLETED | OUTPATIENT
Start: 2022-08-22 | End: 2022-08-22

## 2022-08-22 RX ORDER — GLUCOSAMINE SULFATE 500 MG
CAPSULE ORAL
COMMUNITY

## 2022-08-22 RX ORDER — SODIUM CHLORIDE 0.9 % (FLUSH) 0.9 %
5-40 SYRINGE (ML) INJECTION PRN
Status: DISCONTINUED | OUTPATIENT
Start: 2022-08-22 | End: 2022-08-26 | Stop reason: HOSPADM

## 2022-08-22 RX ORDER — ACETAMINOPHEN 325 MG/1
650 TABLET ORAL EVERY 6 HOURS PRN
Status: DISCONTINUED | OUTPATIENT
Start: 2022-08-22 | End: 2022-08-23

## 2022-08-22 RX ORDER — POLYETHYLENE GLYCOL 3350 17 G/17G
17 POWDER, FOR SOLUTION ORAL DAILY PRN
Status: DISCONTINUED | OUTPATIENT
Start: 2022-08-22 | End: 2022-08-26 | Stop reason: HOSPADM

## 2022-08-22 RX ADMIN — ACETAMINOPHEN 650 MG: 325 TABLET, FILM COATED ORAL at 13:50

## 2022-08-22 RX ADMIN — SODIUM CHLORIDE: 9 INJECTION, SOLUTION INTRAVENOUS at 22:02

## 2022-08-22 RX ADMIN — IOPAMIDOL 75 ML: 755 INJECTION, SOLUTION INTRAVENOUS at 16:35

## 2022-08-22 RX ADMIN — FAMOTIDINE 20 MG: 20 TABLET ORAL at 22:03

## 2022-08-22 RX ADMIN — SODIUM CHLORIDE, POTASSIUM CHLORIDE, SODIUM LACTATE AND CALCIUM CHLORIDE 1000 ML: 600; 310; 30; 20 INJECTION, SOLUTION INTRAVENOUS at 18:32

## 2022-08-22 RX ADMIN — SODIUM CHLORIDE, PRESERVATIVE FREE 10 ML: 5 INJECTION INTRAVENOUS at 21:52

## 2022-08-22 RX ADMIN — AMOXICILLIN AND CLAVULANATE POTASSIUM 1 TABLET: 875; 125 TABLET, FILM COATED ORAL at 18:35

## 2022-08-22 ASSESSMENT — ENCOUNTER SYMPTOMS
COLOR CHANGE: 0
BACK PAIN: 0
BLOOD IN STOOL: 0
CONSTIPATION: 0
RHINORRHEA: 0
SINUS PRESSURE: 0
SHORTNESS OF BREATH: 0
DIARRHEA: 1
NAUSEA: 1
SORE THROAT: 0
CHEST TIGHTNESS: 0
DIARRHEA: 0
EYE REDNESS: 0
VOMITING: 1
VOMITING: 1
NAUSEA: 1
ABDOMINAL PAIN: 0
SHORTNESS OF BREATH: 0
ABDOMINAL PAIN: 0
COUGH: 0
WHEEZING: 0
EYE ITCHING: 0

## 2022-08-22 ASSESSMENT — PAIN - FUNCTIONAL ASSESSMENT
PAIN_FUNCTIONAL_ASSESSMENT: NONE - DENIES PAIN

## 2022-08-22 ASSESSMENT — PAIN SCALES - GENERAL
PAINLEVEL_OUTOF10: 0
PAINLEVEL_OUTOF10: 0

## 2022-08-22 NOTE — PROGRESS NOTES
Александр Humphrey (:  1970) is a 46 y.o. male,, here for evaluation of the following chief complaint(s): Other (Vomitting cant get out of bed and shaking weak  no control of limbs  slurred speech no appetite covid test was negative)      ASSESSMENT/PLAN:  1. Generalized weakness  Assessment & Plan:  patient acutely ill in office on room air 88% so 1 L placed via nasal cannula bottom at 96% EKG stable exam within normal limits in regards to heart and lung exam. No chest pain, no shortness of breath hemodynamically stable otherwise, but far from baseline. At this time patient needs emergency intervention, so a squad was called to take him to [facility], [facility].. He was accompanied with his wife and report was called. No follow-ups on file. SUBJECTIVE/OBJECTIVE:  Patient the office today very acutely ill. States that he is unable to get out of bed and has been vomiting shaking and having slurred speech. His COVID test was negative at home on  and again here in the office. He is unable to bear weight and barely able to communicate to say his name. He is not having any chest pain or shortness of breath he is accompanied with his wife in the room. He normally is able to ambulate and is very physically active going to the gym and maintaining his health however today he needed to be brought in via wheelchair because he cannot stand on his own 2 feet without support. No current facility-administered medications for this visit. Current Outpatient Medications   Medication Sig Dispense Refill    Skin Protectants, Misc. (EUCERIN) cream Apply topically as needed.  454 g 0    terbinafine (LAMISIL) 250 MG tablet Take 1 tablet by mouth daily 84 tablet 0    atorvastatin (LIPITOR) 40 MG tablet Take 1 tablet by mouth daily 90 tablet 1    lisinopril (PRINIVIL;ZESTRIL) 10 MG tablet Take 1 tablet by mouth daily 90 tablet 1     Facility-Administered Medications Ordered in Other Visits   Medication Dose Route Frequency Provider Last Rate Last Admin    acetaminophen (TYLENOL) tablet 650 mg  650 mg Oral Once Pola Bautista MD           Review of Systems   Constitutional:  Positive for activity change. Negative for chills, fatigue and fever. HENT:  Negative for congestion, ear pain, postnasal drip, rhinorrhea, sinus pressure, sneezing and sore throat. Eyes:  Negative for redness and itching. Respiratory:  Negative for cough, chest tightness, shortness of breath and wheezing. Cardiovascular:  Negative for chest pain and palpitations. Gastrointestinal:  Positive for nausea and vomiting. Negative for abdominal pain, blood in stool, constipation and diarrhea. Endocrine: Negative for cold intolerance and heat intolerance. Genitourinary:  Negative for difficulty urinating, dysuria, flank pain, frequency, hematuria and urgency. Musculoskeletal:  Negative for arthralgias, back pain, joint swelling and myalgias. Skin:  Negative for color change, pallor, rash and wound. Allergic/Immunologic: Negative for environmental allergies and food allergies. Neurological:  Positive for tremors, speech difficulty and weakness. Negative for dizziness, seizures, syncope, light-headedness, numbness and headaches. Hematological:  Negative for adenopathy. Does not bruise/bleed easily. Psychiatric/Behavioral:  Negative for confusion, sleep disturbance and suicidal ideas. The patient is not nervous/anxious and is not hyperactive. Vitals:    08/22/22 1207   BP: (!) 148/84   Site: Right Upper Arm   Position: Sitting   Cuff Size: Large Adult   Pulse: 82   Temp: 98.6 °F (37 °C)   SpO2: 92%       Physical Exam  Constitutional:       Appearance: Normal appearance. He is well-developed. He is ill-appearing. HENT:      Head: Normocephalic and atraumatic. Right Ear: Hearing normal.      Left Ear: Hearing normal.      Nose: No mucosal edema.       Right Sinus: No maxillary sinus tenderness or frontal sinus tenderness. Left Sinus: No maxillary sinus tenderness or frontal sinus tenderness. Mouth/Throat: Tonsils: No tonsillar abscesses. Eyes:      Extraocular Movements: Extraocular movements intact. Pupils: Pupils are equal, round, and reactive to light. Cardiovascular:      Rate and Rhythm: Normal rate and regular rhythm. Pulses: Normal pulses. Heart sounds: Normal heart sounds. Pulmonary:      Effort: Pulmonary effort is normal.      Breath sounds: Normal breath sounds. Lymphadenopathy:      Head:      Right side of head: No submental, submandibular, tonsillar, preauricular, posterior auricular or occipital adenopathy. Left side of head: No submental, submandibular, tonsillar, preauricular, posterior auricular or occipital adenopathy. Skin:     General: Skin is warm and dry. Neurological:      General: No focal deficit present. Mental Status: He is lethargic. Gait: Gait is intact. Comments: Severe weakness unable to bear weight two-person assist to get from wheelchair to table and table to stretcher. Psychiatric:         Mood and Affect: Mood normal.         Behavior: Behavior normal.               An electronic signature was used to authenticate this note.     --Chele Jose, AMENA - CNP

## 2022-08-22 NOTE — ED PROVIDER NOTES
[id number] RESIDENT NOTE       Date of evaluation: 8/22/2022    Chief Complaint     Tremors (Pt presents with the complaint of tremors in his extremities with weakness. Pt states she started with flu like symptoms four days ago. Pt  is unable to walk, has stuttering of the speech that is new. Pts wife states his urine appears to have blood in it. Pt is alert and oriented. Pt states he does not feel like he can walk or control his arms. )      History of Present Illness     Ten Dawson is a 46 y.o. male with PMH of hypertension, HLD, BPH, CAD Who presents with tremors, weakness, slurred speech. History from wife and patient states that on Friday he had flulike symptoms was fatigued, tired and was in bed all day. His wife thought he just had the flu so just let him rest.  At the same time he was having nausea and vomiting as well as diarrhea. On Friday he started having blood in his urine. He states that he has some burning on urination. On Sunday he started noticing shaking, tremors, losing control of his limbs. He got up to walk he felt like his legs gave out and he fell, and he noticed stuttering speech. He denies any pins-and-needles sensation. States the tremors occur with activity when he was going to  a class. His He had 2 episodes of vomiting, with nausea this morning. He has not eaten anything since Friday. He went to his PCPs office, and she told him to come to the emergency department. Patient tested negative for COVID twice. He is not vaccinated. He denies any recent vaccinations. He states his mother had a history of Guillain-Barré, and he is worried that this might be it. He denies any pain anywhere, no chest pain, shortness of breath, abdominal pain. Review of Systems     Review of Systems   Constitutional:  Positive for activity change and appetite change. Respiratory:  Negative for shortness of breath.     Cardiovascular: Negative for chest pain. Gastrointestinal:  Positive for diarrhea, nausea and vomiting. Negative for abdominal pain. Genitourinary:  Positive for dysuria and hematuria. Neurological:  Positive for tremors, speech difficulty, weakness and numbness. Negative for dizziness, light-headedness and headaches. Psychiatric/Behavioral:  Negative for behavioral problems. Past Medical, Surgical, Family, and Social History     He has a past medical history of Arteriosclerotic coronary artery disease, Benign prostatic hypertrophy, Elevated blood-pressure reading without diagnosis of hypertension, Essential hypertension, Gastroesophageal reflux disease without esophagitis, GERD (gastroesophageal reflux disease), Hyperlipidemia, Hypertension, and Microscopic hematuria. He has a past surgical history that includes Wrist ganglion excision (1993); Hammer toe surgery (2008); Hammer toe surgery; Hammer toe surgery; Wrist ganglion excision (1989); lumbar laminectomy (March 9, 2012); laparoscopic appendectomy (March 4, 2014); and Upper gastrointestinal endoscopy (about 2010). His family history includes Cancer (age of onset: 61) in his mother; Diabetes (age of onset: 43) in his sister; High Blood Pressure in his father; Hypertension in his father; No Known Problems in his daughter and son; Other in his mother and sister. He reports that he has never smoked. He has never used smokeless tobacco. He reports current alcohol use. He reports that he does not use drugs. Medications     Current Discharge Medication List        CONTINUE these medications which have NOT CHANGED    Details   Multiple Vitamins-Minerals (THERAPEUTIC MULTIVITAMIN-MINERALS) tablet Take 1 tablet by mouth daily      Glucosamine 500 MG CAPS Take by mouth      Skin Protectants, Misc. (EUCERIN) cream Apply topically as needed.   Qty: 454 g, Refills: 0      terbinafine (LAMISIL) 250 MG tablet Take 1 tablet by mouth daily  Qty: 84 tablet, Refills: 0 antigens    Specimen: Nasopharyngeal   Result Value Ref Range    Rapid Influenza A Ag Negative Negative    Rapid Influenza B Ag Negative Negative   Comprehensive Metabolic Panel   Result Value Ref Range    Sodium 130 (L) 136 - 145 mmol/L    Potassium 4.7 3.5 - 5.1 mmol/L    Chloride 93 (L) 99 - 110 mmol/L    CO2 23 21 - 32 mmol/L    Anion Gap 14 3 - 16    Glucose 127 (H) 70 - 99 mg/dL    BUN 18 7 - 20 mg/dL    Creatinine 1.2 0.9 - 1.3 mg/dL    GFR Non-African American >60 >60    GFR African American >60 >60    Calcium 9.5 8.3 - 10.6 mg/dL    Total Protein 8.1 6.4 - 8.2 g/dL    Albumin 3.9 3.4 - 5.0 g/dL    Albumin/Globulin Ratio 0.9 (L) 1.1 - 2.2    Total Bilirubin 0.7 0.0 - 1.0 mg/dL    Alkaline Phosphatase 81 40 - 129 U/L     (H) 10 - 40 U/L     (H) 15 - 37 U/L   Magnesium   Result Value Ref Range    Magnesium 2.20 1.80 - 2.40 mg/dL   CBC with Auto Differential   Result Value Ref Range    WBC 9.3 4.0 - 11.0 K/uL    RBC 4.98 4.20 - 5.90 M/uL    Hemoglobin 14.4 13.5 - 17.5 g/dL    Hematocrit 42.6 40.5 - 52.5 %    MCV 85.6 80.0 - 100.0 fL    MCH 28.9 26.0 - 34.0 pg    MCHC 33.8 31.0 - 36.0 g/dL    RDW 13.7 12.4 - 15.4 %    Platelets 504 772 - 987 K/uL    MPV 8.0 5.0 - 10.5 fL    Neutrophils % 80.9 %    Lymphocytes % 12.9 %    Monocytes % 5.3 %    Eosinophils % 0.0 %    Basophils % 0.9 %    Neutrophils Absolute 7.5 1.7 - 7.7 K/uL    Lymphocytes Absolute 1.2 1.0 - 5.1 K/uL    Monocytes Absolute 0.5 0.0 - 1.3 K/uL    Eosinophils Absolute 0.0 0.0 - 0.6 K/uL    Basophils Absolute 0.1 0.0 - 0.2 K/uL   Lactic Acid   Result Value Ref Range    Lactic Acid 1.7 0.4 - 2.0 mmol/L   Urinalysis with Reflex to Culture    Specimen: Urine, clean catch   Result Value Ref Range    Color, UA Yellow Straw/Yellow    Clarity, UA Clear Clear    Glucose, Ur Negative Negative mg/dL    Bilirubin Urine SMALL (A) Negative    Ketones, Urine Negative Negative mg/dL    Specific Gravity, UA >=1.030 1.005 - 1.030    Blood, Urine LARGE (A) Negative    pH, UA 6.0 5.0 - 8.0    Protein, UA >=300 (A) Negative mg/dL    Urobilinogen, Urine 0.2 <2.0 E.U./dL    Nitrite, Urine Negative Negative    Leukocyte Esterase, Urine Negative Negative    Microscopic Examination YES     Urine Type NotGiven     Urine Reflex to Culture Not Indicated    Sedimentation Rate   Result Value Ref Range    Sed Rate 88 (H) 0 - 20 mm/Hr   Hepatic Function Panel   Result Value Ref Range    Total Protein 8.4 (H) 6.4 - 8.2 g/dL    Albumin 3.9 3.4 - 5.0 g/dL    Alkaline Phosphatase 80 40 - 129 U/L     (H) 10 - 40 U/L     (H) 15 - 37 U/L    Total Bilirubin 0.7 0.0 - 1.0 mg/dL    Bilirubin, Direct <0.2 0.0 - 0.3 mg/dL    Bilirubin, Indirect see below 0.0 - 1.0 mg/dL   Microscopic Urinalysis   Result Value Ref Range    Hyaline Casts, UA 3-5 (A) 0 - 2 /LPF    Coarse Casts, UA 3-5 (A) 0 - 2 /LPF    Mucus, UA 1+ (A) None Seen /LPF    WBC, UA 0-2 0 - 5 /HPF    RBC, UA 0-2 0 - 4 /HPF    Renal Epithelial, UA 0-1 0 - 1 /HPF    Bacteria, UA 3+ (A) None Seen /HPF   CK   Result Value Ref Range    Total  (H) 39 - 308 U/L   Mononucleosis screen   Result Value Ref Range    Mono Test Negative Negative       ED BEDSIDE ULTRASOUND:  No results found. RECENT VITALS:  BP: 114/69, Temp: (!) 100.9 °F (38.3 °C),Heart Rate: 88, Resp: 18, SpO2: 93 %     Procedures     none    ED Course     Nursing Notes, Past Medical Hx, Past Surgical Hx, Social Hx, Allergies, and FamilyHx were reviewed.     ED Course as of 08/22/22 2311   Mon Aug 22, 2022   1616 Chloride(!): 93 [AS]   1703 Hepatic Function Panel [AS]   0039 CK [AS]   1944 CT ABDOMEN PELVIS W IV CONTRAST Additional Contrast? Radiologist Recommendation [AS]      ED Course User Index  [AS] Lore Cralson MD       The patient was giventhe following medications:  Orders Placed This Encounter   Medications    acetaminophen (TYLENOL) tablet 650 mg    iopamidol (ISOVUE-370) 76 % injection 75 mL    lactated ringers bolus amoxicillin-clavulanate (AUGMENTIN) 875-125 MG per tablet 1 tablet     Order Specific Question:   Antimicrobial Indications     Answer:   Pneumonia (CAP)     Order Specific Question:   Antimicrobial Indications     Answer: Other     Order Specific Question:   Other Abx Indication     Answer:   colitis    sodium chloride flush 0.9 % injection 5-40 mL    sodium chloride flush 0.9 % injection 5-40 mL    0.9 % sodium chloride infusion    enoxaparin (LOVENOX) injection 40 mg     Order Specific Question:   Indication of Use     Answer:   Prophylaxis-DVT/PE    OR Linked Order Group     ondansetron (ZOFRAN-ODT) disintegrating tablet 4 mg     ondansetron (ZOFRAN) injection 4 mg    polyethylene glycol (GLYCOLAX) packet 17 g    OR Linked Order Group     acetaminophen (TYLENOL) tablet 650 mg     acetaminophen (TYLENOL) suppository 650 mg    famotidine (PEPCID) tablet 20 mg    aluminum & magnesium hydroxide-simethicone (MAALOX) 200-200-20 MG/5ML suspension 30 mL       CONSULTS:  IP CONSULT TO HOSPITALIST    MEDICAL DECISION MAKING / ASSESSMENT / Elijah Leventhal is a 46 y.o. male PMH of hypertension, HLD, BPH, CAD, presenting today from his PCP's office with tremors, weakness, slurred speech, hematuria. On initial assessment ill-appearing, with rigors, stuttering speech. He has fever to 103, heart rate normal, he satting in the 90s on room air. Neurologic exam grossly intact. No signs of meningismus. Broad infectious work-up done. CMP showed sodium of 130, creatinine was 1.2. Patient had transaminitis though hepatic function panel was repeated, AST hemolyzed but ALT elevated. Pt was started on ketoconazole for toe fungus about 1 month ago. CBC was within normal limits. UA positive for large blood micro had normal RBCs, but no signs of infection. Concern for rhabdomyolysis. CT abdomen pelvis done due to fever of unknown origin. Showed a RLL PNA and enteritis.  Pt was given tylenol for fever and as fever came down his symptoms improved. He was also given 1L of fluids and started on augmentin. Though his CURB-65 was 0, pt was unable to ambulate in the ED. Jovita Curiel Pt will be admitted to hospitalist for further workup. This patient was also evaluated by the attending physician. All care plans were discussed and agreed upon. Clinical Impression     1. Fever in other diseases    2. Pneumonia of right lower lobe due to infectious organism    3. Enteritis        Disposition     PATIENT REFERRED TO:  No follow-up provider specified.     DISCHARGE MEDICATIONS:  Current Discharge Medication List          DISPOSITION Admitted 08/22/2022 08:52:58 PM      Vivek Cai MD  Resident  08/22/22 8903

## 2022-08-22 NOTE — ED PROVIDER NOTES
ED Attending Attestation Note     Date of evaluation: 8/22/2022    This patient was seen by the resident. I have seen and examined the patient, agree with the workup, evaluation, management and diagnosis. The care plan has been discussed. I have reviewed the ECG and concur with the resident's interpretation. My assessment reveals 66-year-old male with a history of BPH, microscopic hematuria, hypertension presenting for concerns of pain with urination and fevers for 3 days. On my examination, the patient's afebrile. He does not have any current complaints. Blood work prior to my evaluation was largely benign, with hemolyzed transaminitis. Currently pending urinalysis. Does have a history of stones, however feels comfortable. Coronavirus and influenza negative. On further conversation, the patient does have a significant workout history, and has had darkened urine for some time. Blood work concerning for rhabdomyolysis. Given the patient's transaminitis, chills, somewhat unwell appearance, CT abdomen was obtained which showed concern for pneumonia. He also had evidence of possible enteritis. Given these compilation of findings, the plan was to admit the patient for hematuria, pneumonia, enteritis.        Solo Preston MD  08/22/22 [id number]

## 2022-08-23 PROBLEM — A41.9 SEPSIS (HCC): Status: ACTIVE | Noted: 2022-08-23

## 2022-08-23 LAB
A/G RATIO: 1.2 (ref 1.1–2.2)
ALBUMIN SERPL-MCNC: 3.9 G/DL (ref 3.4–5)
ALP BLD-CCNC: 84 U/L (ref 40–129)
ALT SERPL-CCNC: 384 U/L (ref 10–40)
ANION GAP SERPL CALCULATED.3IONS-SCNC: 14 MMOL/L (ref 3–16)
AST SERPL-CCNC: 418 U/L (ref 15–37)
BACTERIA: ABNORMAL /HPF
BASOPHILS ABSOLUTE: 0 K/UL (ref 0–0.2)
BASOPHILS RELATIVE PERCENT: 0.3 %
BILIRUB SERPL-MCNC: 0.8 MG/DL (ref 0–1)
BILIRUBIN URINE: ABNORMAL
BLOOD, URINE: ABNORMAL
BUN BLDV-MCNC: 17 MG/DL (ref 7–20)
CALCIUM SERPL-MCNC: 9 MG/DL (ref 8.3–10.6)
CELLULAR CASTS: ABNORMAL /LPF
CHLORIDE BLD-SCNC: 96 MMOL/L (ref 99–110)
CLARITY: CLEAR
CO2: 24 MMOL/L (ref 21–32)
COARSE CASTS, UA: ABNORMAL /LPF (ref 0–2)
COLOR: YELLOW
CREAT SERPL-MCNC: 1.2 MG/DL (ref 0.9–1.3)
EOSINOPHILS ABSOLUTE: 0 K/UL (ref 0–0.6)
EOSINOPHILS RELATIVE PERCENT: 0 %
GFR AFRICAN AMERICAN: >60
GFR NON-AFRICAN AMERICAN: >60
GLUCOSE BLD-MCNC: 129 MG/DL (ref 70–99)
GLUCOSE URINE: NEGATIVE MG/DL
HAV IGM SER IA-ACNC: NORMAL
HCT VFR BLD CALC: 43.1 % (ref 40.5–52.5)
HEMOGLOBIN: 14.2 G/DL (ref 13.5–17.5)
HEPATITIS B CORE IGM ANTIBODY: NORMAL
HEPATITIS B SURFACE ANTIGEN INTERPRETATION: NORMAL
HEPATITIS C ANTIBODY INTERPRETATION: NORMAL
INFLUENZA A: NOT DETECTED
INFLUENZA B: NOT DETECTED
KETONES, URINE: NEGATIVE MG/DL
LEUKOCYTE ESTERASE, URINE: NEGATIVE
LYMPHOCYTES ABSOLUTE: 1 K/UL (ref 1–5.1)
LYMPHOCYTES RELATIVE PERCENT: 13.2 %
MCH RBC QN AUTO: 28.5 PG (ref 26–34)
MCHC RBC AUTO-ENTMCNC: 33 G/DL (ref 31–36)
MCV RBC AUTO: 86.3 FL (ref 80–100)
MICROSCOPIC EXAMINATION: YES
MONO TEST: NEGATIVE
MONOCYTES ABSOLUTE: 0.5 K/UL (ref 0–1.3)
MONOCYTES RELATIVE PERCENT: 6.7 %
MUCUS: ABNORMAL /LPF
NEUTROPHILS ABSOLUTE: 5.8 K/UL (ref 1.7–7.7)
NEUTROPHILS RELATIVE PERCENT: 79.8 %
NITRITE, URINE: NEGATIVE
PDW BLD-RTO: 13.6 % (ref 12.4–15.4)
PH UA: 6.5 (ref 5–8)
PLATELET # BLD: 192 K/UL (ref 135–450)
PMV BLD AUTO: 8.1 FL (ref 5–10.5)
POTASSIUM REFLEX MAGNESIUM: 4.6 MMOL/L (ref 3.5–5.1)
PROCALCITONIN: 1.78 NG/ML (ref 0–0.15)
PROTEIN UA: 100 MG/DL
RBC # BLD: 5 M/UL (ref 4.2–5.9)
RBC UA: ABNORMAL /HPF (ref 0–4)
SARS-COV-2 RNA, RT PCR: NOT DETECTED
SODIUM BLD-SCNC: 134 MMOL/L (ref 136–145)
SPECIFIC GRAVITY UA: 1.02 (ref 1–1.03)
TOTAL CK: 425 U/L (ref 39–308)
TOTAL PROTEIN: 7.1 G/DL (ref 6.4–8.2)
URINE TYPE: ABNORMAL
UROBILINOGEN, URINE: 2 E.U./DL
VITAMIN B-12: 1458 PG/ML (ref 211–911)
WBC # BLD: 7.3 K/UL (ref 4–11)
WBC UA: ABNORMAL /HPF (ref 0–5)

## 2022-08-23 PROCEDURE — 6360000002 HC RX W HCPCS: Performed by: INTERNAL MEDICINE

## 2022-08-23 PROCEDURE — 1200000000 HC SEMI PRIVATE

## 2022-08-23 PROCEDURE — 82550 ASSAY OF CK (CPK): CPT

## 2022-08-23 PROCEDURE — G0378 HOSPITAL OBSERVATION PER HR: HCPCS

## 2022-08-23 PROCEDURE — 86702 HIV-2 ANTIBODY: CPT

## 2022-08-23 PROCEDURE — 86701 HIV-1ANTIBODY: CPT

## 2022-08-23 PROCEDURE — 96361 HYDRATE IV INFUSION ADD-ON: CPT

## 2022-08-23 PROCEDURE — 86308 HETEROPHILE ANTIBODY SCREEN: CPT

## 2022-08-23 PROCEDURE — 2580000003 HC RX 258: Performed by: INTERNAL MEDICINE

## 2022-08-23 PROCEDURE — 81001 URINALYSIS AUTO W/SCOPE: CPT

## 2022-08-23 PROCEDURE — 84145 PROCALCITONIN (PCT): CPT

## 2022-08-23 PROCEDURE — 87636 SARSCOV2 & INF A&B AMP PRB: CPT

## 2022-08-23 PROCEDURE — 36415 COLL VENOUS BLD VENIPUNCTURE: CPT

## 2022-08-23 PROCEDURE — 85025 COMPLETE CBC W/AUTO DIFF WBC: CPT

## 2022-08-23 PROCEDURE — 80074 ACUTE HEPATITIS PANEL: CPT

## 2022-08-23 PROCEDURE — 6370000000 HC RX 637 (ALT 250 FOR IP): Performed by: INTERNAL MEDICINE

## 2022-08-23 PROCEDURE — 87390 HIV-1 AG IA: CPT

## 2022-08-23 PROCEDURE — 6370000000 HC RX 637 (ALT 250 FOR IP)

## 2022-08-23 PROCEDURE — 80053 COMPREHEN METABOLIC PANEL: CPT

## 2022-08-23 RX ORDER — SODIUM CHLORIDE, SODIUM LACTATE, POTASSIUM CHLORIDE, CALCIUM CHLORIDE 600; 310; 30; 20 MG/100ML; MG/100ML; MG/100ML; MG/100ML
INJECTION, SOLUTION INTRAVENOUS CONTINUOUS
Status: DISCONTINUED | OUTPATIENT
Start: 2022-08-23 | End: 2022-08-26 | Stop reason: HOSPADM

## 2022-08-23 RX ORDER — ACETAMINOPHEN 325 MG/1
650 TABLET ORAL EVERY 4 HOURS PRN
Status: DISCONTINUED | OUTPATIENT
Start: 2022-08-23 | End: 2022-08-26 | Stop reason: HOSPADM

## 2022-08-23 RX ORDER — ACETAMINOPHEN 325 MG/1
TABLET ORAL
Status: COMPLETED
Start: 2022-08-23 | End: 2022-08-23

## 2022-08-23 RX ORDER — ACETAMINOPHEN 650 MG/1
650 SUPPOSITORY RECTAL EVERY 4 HOURS PRN
Status: DISCONTINUED | OUTPATIENT
Start: 2022-08-23 | End: 2022-08-26 | Stop reason: HOSPADM

## 2022-08-23 RX ADMIN — SODIUM CHLORIDE, POTASSIUM CHLORIDE, SODIUM LACTATE AND CALCIUM CHLORIDE: 600; 310; 30; 20 INJECTION, SOLUTION INTRAVENOUS at 12:24

## 2022-08-23 RX ADMIN — ACETAMINOPHEN 650 MG: 325 TABLET, FILM COATED ORAL at 00:00

## 2022-08-23 RX ADMIN — SODIUM CHLORIDE 3000 MG: 900 INJECTION, SOLUTION INTRAVENOUS at 12:10

## 2022-08-23 RX ADMIN — FAMOTIDINE 20 MG: 20 TABLET ORAL at 20:00

## 2022-08-23 RX ADMIN — SODIUM CHLORIDE, PRESERVATIVE FREE 10 ML: 5 INJECTION INTRAVENOUS at 19:55

## 2022-08-23 RX ADMIN — SODIUM CHLORIDE: 9 INJECTION, SOLUTION INTRAVENOUS at 04:17

## 2022-08-23 RX ADMIN — FAMOTIDINE 20 MG: 20 TABLET ORAL at 09:04

## 2022-08-23 RX ADMIN — SODIUM CHLORIDE 3000 MG: 900 INJECTION, SOLUTION INTRAVENOUS at 19:56

## 2022-08-23 RX ADMIN — ACETAMINOPHEN 650 MG: 325 TABLET, FILM COATED ORAL at 21:08

## 2022-08-23 RX ADMIN — ENOXAPARIN SODIUM 40 MG: 100 INJECTION SUBCUTANEOUS at 12:33

## 2022-08-23 RX ADMIN — ACETAMINOPHEN 650 MG: 325 TABLET, FILM COATED ORAL at 04:16

## 2022-08-23 RX ADMIN — ACETAMINOPHEN 650 MG: 325 TABLET, FILM COATED ORAL at 09:04

## 2022-08-23 RX ADMIN — ACETAMINOPHEN 650 MG: 325 TABLET, FILM COATED ORAL at 16:54

## 2022-08-23 RX ADMIN — SODIUM CHLORIDE, POTASSIUM CHLORIDE, SODIUM LACTATE AND CALCIUM CHLORIDE: 600; 310; 30; 20 INJECTION, SOLUTION INTRAVENOUS at 21:47

## 2022-08-23 RX ADMIN — AZITHROMYCIN DIHYDRATE 500 MG: 500 INJECTION, POWDER, LYOPHILIZED, FOR SOLUTION INTRAVENOUS at 12:25

## 2022-08-23 ASSESSMENT — PAIN SCALES - GENERAL
PAINLEVEL_OUTOF10: 0

## 2022-08-23 NOTE — CONSULTS
Urology Attending Consult Note      Reason for Consultation: Gross hematuria    History: 47 yo M with no prior  history who presented to [facility] ER with generalized weakness. Reported darker colored urine over the last few days but today he reports noticeable blood. No associated dysuria, urgency/frequency. Denies flank pain or suprapubic pain. CT scan revealed no  abnormalities. Cr WNL. UA showing large blood. He denies smoking history/familial history of prostate/bladder cancer. Family History, Social History, Review of Systems:  Reviewed and agreed to as per chart    Vitals:  /78   Pulse 89   Temp (!) 101.4 °F (38.6 °C) (Oral)   Resp 18   Ht 6' 8\" (2.032 m)   Wt 219 lb 5.7 oz (99.5 kg)   SpO2 97%   BMI 24.10 kg/m²   Temp  Av.1 °F (38.4 °C)  Min: 98.6 °F (37 °C)  Max: 103.1 °F (39.5 °C)    Intake/Output Summary (Last 24 hours) at 2022 1114  Last data filed at 2022 0407  Gross per 24 hour   Intake 2232.64 ml   Output --   Net 2232.64 ml         Physical:  Well developed, well nourished in no acute distress  Mood indicates no abnormalities. Pt doesnt appear depressed  Orientated to time and place  Neck is supple, trachea is midline  Respiratory effort is normal  Cardiovascular show no extremity swelling  Abdomen no masses or hernias are palpated, there is no tenderness. Liver and Spleen appear normal.  Skin show no abnormal lesions  Lymph nodes are not palpated in the inguinal, neck, or axillary area.      Male :  No urine able to be visualized       Labs:  WBC:    Lab Results   Component Value Date/Time    WBC 7.3 2022 03:37 AM     Hemoglobin/Hematocrit:    Lab Results   Component Value Date/Time    HGB 14.2 2022 03:37 AM    HCT 43.1 2022 03:37 AM     BMP:    Lab Results   Component Value Date/Time     2022 03:37 AM    K 4.6 2022 03:37 AM    CL 96 2022 03:37 AM    CO2 24 2022 03:37 AM    BUN 17 2022 03:37 AM    LABALBU 3.9 08/23/2022 03:37 AM    CREATININE 1.2 08/23/2022 03:37 AM    CALCIUM 9.0 08/23/2022 03:37 AM    GFRAA >60 08/23/2022 03:37 AM    GFRAA 106 03/06/2012 04:55 AM    LABGLOM >60 08/23/2022 03:37 AM     PT/INR:    Lab Results   Component Value Date/Time    PROTIME 11.9 02/08/2017 07:40 AM    INR 1.04 02/08/2017 07:40 AM     PTT:    Lab Results   Component Value Date/Time    APTT 34.0 01/02/2017 10:25 AM   [APTT    Urinalysis: Large blood    Urine Culture: Not sent      Imaging:   Impression       1. Right lower lobe pneumonia. 2. Air-fluid levels throughout normal caliber colon. No evidence of mechanical obstruction. Appearance may relate to enteritis. Impression/Plan: 47 yo M admitted with acute viral illness. We were consulted for gross hematuria. -Reporting visible blood this AM with urination. No associated pain  -Unable to view urine sample at this time  -CT negative  -Recommend bladder scan  -If his urine appearance starts to clear we can perform cystoscopy as outpatient. Will monitor throughout the day.      SHAKEEL Etienne

## 2022-08-23 NOTE — PLAN OF CARE
Pt free from falls this shift. Fall precautions in place at all times. Call light always withinreach. Pt able and agreeable to contact for safety appropriately. Bed alarm on.

## 2022-08-23 NOTE — PROGRESS NOTES
Patient admitted to 3312 from ED. A&Ox4. No c/o SOB, pain, or nausea at this time. RA, sat 95%. Lungs clear. Skin intact. Patient slightly febrile. Up with assist to bathroom. No needs at this time. Patient resting comfortably in bed. Call light in reach. Bed alarm on. Will continue to monitor.    Electronically signed by Ninoska Cisse RN on 8/22/2022 at 11:05 PM

## 2022-08-23 NOTE — PLAN OF CARE
Problem: Metabolic/Fluid and Electrolytes - Adult  Goal: Electrolytes maintained within normal limits  Outcome: Progressing

## 2022-08-23 NOTE — PROGRESS NOTES
Urine sample collected and walked to lab. Urine is tea colored, no valerie blood visualized. Pt's spouse states blood is seen at the tip of his penis upon urination.

## 2022-08-23 NOTE — PROGRESS NOTES
08/23/22 1438   Encounter Summary   Encounter Overview/Reason  Initial Encounter  (Reviewed chart and talked to nurse.)   Service Provided For: Patient   Referral/Consult From: [address] [place] [address] Significant other;Family members   Complexity of Encounter Moderate   Begin Time 1437   End Time  1442   Total Time Calculated 5 min   Encounter    Type Initial Screen/Assessment   Spiritual/Emotional needs   Type Spiritual Support   Assessment/Intervention/Outcome   Assessment Unable to assess  (Patient sleeping.)   Intervention Active listening   Outcome Expressed Gratitude;Receptive  (Patient was sleeping.  I talked to his significant other or friend.)

## 2022-08-23 NOTE — PROGRESS NOTES
4 Eyes Skin Assessment     NAME:  Melanie Eastmna  YOB: 1970  MEDICAL RECORD NUMBER:  [id number]    The patient is being assess for  Admission    I agree that 2 RN's have performed a thorough Head to Toe Skin Assessment on the patient. ALL assessment sites listed below have been assessed. Areas assessed by both nurses:    Head, Face, Ears, Shoulders, Back, Chest, Arms, Elbows, Hands, Sacrum. Buttock, Coccyx, Ischium, and Legs. Feet and Heels        Does the Patient have a Wound?  No noted wound(s)       Miguel Prevention initiated:  No   Wound Care Orders initiated:  No    Pressure Injury (Stage 3,4, Unstageable, DTI, NWPT, and Complex wounds) if present place referral/consult order under [de-identified] No    New and Established Ostomies if present place consult order under : No      Nurse 1 eSignature: Electronically signed by Lashawn Austin RN on 8/22/22 at 11:05 PM EDT    **SHARE this note so that the co-signing nurse is able to place an eSignature**    Nurse 2 eSignature: {Esignature:[id number]}

## 2022-08-23 NOTE — PROGRESS NOTES
Hospital Medicine Care  Progress Note      Chief complain; Tremors (Pt presents with the complaint of tremors in his extremities with weakness. Pt states she started with flu like symptoms four days ago. Pt  is unable to walk, has stuttering of the speech that is new. Pts wife states his urine appears to have blood in it. Pt is alert and oriented. Pt states he does not feel like he can walk or control his arms. )            Subjective:     P/w acute febrile illness  Denies any HA, photophobia  Denies any nuchal rigidity  Has had hematuria  C/o diarrhea    Review of Systems:     Review of Systems as mentioned above, other ros is negative. Objective:       Medications        Scheduled Meds:   azithromycin  500 mg IntraVENous Q24H    ampicillin-sulbactam  3,000 mg IntraVENous Q6H    sodium chloride flush  5-40 mL IntraVENous 2 times per day    enoxaparin  40 mg SubCUTAneous Daily    famotidine  20 mg Oral BID     Continuous Infusions:   lactated ringers       PRN Meds:.acetaminophen **OR** acetaminophen, sodium chloride flush, ondansetron **OR** ondansetron, polyethylene glycol, aluminum & magnesium hydroxide-simethicone      Allergies  No Known Allergies      Vitals:    08/22/22 2151 08/22/22 2356 08/23/22 0359 08/23/22 0857   BP: 114/69  (!) 144/70 139/78   Pulse: 88 92 90 89   Resp: 18  18 18   Temp: (!) 100.9 °F (38.3 °C) (!) 103.1 °F (39.5 °C) (!) 102.7 °F (39.3 °C) (!) 101.4 °F (38.6 °C)   TempSrc: Oral Oral Oral Oral   SpO2: 93%  94% 97%   Weight: 219 lb 5.7 oz (99.5 kg)      Height: 6' 8\" (2.032 m)            Physical exam:         Physical Exam  Constitutional:       Appearance: Normal appearance. HENT:      Head: Normocephalic and atraumatic. Cardiovascular:      Rate and Rhythm: Normal rate and regular rhythm. Pulses: Normal pulses. Heart sounds: Normal heart sounds. Pulmonary:      Effort: Pulmonary effort is normal.      Breath sounds: Normal breath sounds.    Abdominal:      General: Abdomen is flat. Palpations: Abdomen is soft. Musculoskeletal:         General: No swelling or tenderness. Normal range of motion. Skin:     General: Skin is warm. Capillary Refill: Capillary refill takes less than 2 seconds. Neurological:      General: No focal deficit present. Mental Status: He is alert and oriented to person, place, and time. Psychiatric:         Mood and Affect: Mood normal.         Behavior: Behavior normal.             Labs      Recent Labs     08/22/22  1356 08/23/22  0337   WBC 9.3 7.3   HGB 14.4 14.2   HCT 42.6 43.1    192   MCV 85.6 86.3        Recent Labs     08/22/22  1356 08/23/22  0337   * 134*   K 4.7 4.6   CL 93* 96*   CO2 23 24   BUN 18 17   CREATININE 1.2 1.2       Recent Labs     08/22/22  1345 08/22/22  1356 08/23/22  0337   * 360* 418*   * 291* 384*   BILIDIR <0.2  --   --    BILITOT 0.7 0.7 0.8   ALKPHOS 80 81 84       Recent Labs     08/22/22  1356   MG 2.20       Radiology  CT ABDOMEN PELVIS W IV CONTRAST Additional Contrast? Radiologist Recommendation   Final Result      1. Right lower lobe pneumonia. 2. Air-fluid levels throughout normal caliber colon. No evidence of mechanical obstruction. Appearance may relate to enteritis. XR CHEST PORTABLE   Final Result      1. Negative portable chest.                 Assessment and Plan:   Principal Problem:    Acute viral syndrome  Active Problems:    Hyperlipidemia    Essential hypertension    Microscopic hematuria    Generalized weakness    Transaminitis    Hyponatremia    Sepsis ([address]    Benign prostatic hyperplasia  Resolved Problems:    * No resolved hospital problems.  *       Plan    Acute febrile illness  Check Monospot  Check COVID  Respiratory panel PCR ordered  Check Hepatitis panel  Check HIV      RLL infiltrate  With fever will treat with abx  Start Azithromycin and Unasyn  Check Monospot  Check COVID  Respiratory panel PCR ordered  Flu negative      Hematuria  UA showed large blood with minimal RBCs  CPK lower than thousand doubt rhabdo  Urology consultation    Abnormal LFT  Suspect related to viral infection, terbinafine   Continue monitoring daily  If worsen  consider GI consultation  Check Hepatitis panel      DVT ppx- Jayro Arora MD  8/23/2022

## 2022-08-23 NOTE — PROGRESS NOTES
Following secure message sent to house MD:    Patient admitted [facility] for viral illness. Patient has temp of 103.1 at midnight which I gave him tylenol and ice packs. His temp now is 102.7. Can the tylenol order be changed to every four hours PRN? Would you like any further orders.      Electronically signed by Lashawn Austin RN on 8/23/2022 at 4:07 AM

## 2022-08-24 ENCOUNTER — APPOINTMENT (OUTPATIENT)
Dept: CT IMAGING | Age: 52
DRG: 871 | End: 2022-08-24
Payer: COMMERCIAL

## 2022-08-24 PROBLEM — R79.89 ELEVATED LIVER FUNCTION TESTS: Status: ACTIVE | Noted: 2022-08-24

## 2022-08-24 PROBLEM — R50.9 FEVER: Status: ACTIVE | Noted: 2022-08-24

## 2022-08-24 LAB
A/G RATIO: 1.1 (ref 1.1–2.2)
ALBUMIN SERPL-MCNC: 3.1 G/DL (ref 3.4–5)
ALP BLD-CCNC: 92 U/L (ref 40–129)
ALT SERPL-CCNC: 282 U/L (ref 10–40)
ANION GAP SERPL CALCULATED.3IONS-SCNC: 13 MMOL/L (ref 3–16)
AST SERPL-CCNC: 216 U/L (ref 15–37)
BANDED NEUTROPHILS RELATIVE PERCENT: 11 % (ref 0–7)
BASOPHILS ABSOLUTE: 0 K/UL (ref 0–0.2)
BASOPHILS RELATIVE PERCENT: 0 %
BILIRUB SERPL-MCNC: 0.5 MG/DL (ref 0–1)
BILIRUBIN DIRECT: <0.2 MG/DL (ref 0–0.3)
BILIRUBIN, INDIRECT: NORMAL MG/DL (ref 0–1)
BUN BLDV-MCNC: 12 MG/DL (ref 7–20)
CALCIUM SERPL-MCNC: 8.2 MG/DL (ref 8.3–10.6)
CHLORIDE BLD-SCNC: 96 MMOL/L (ref 99–110)
CO2: 25 MMOL/L (ref 21–32)
CREAT SERPL-MCNC: 1.1 MG/DL (ref 0.9–1.3)
EOSINOPHILS ABSOLUTE: 0 K/UL (ref 0–0.6)
EOSINOPHILS RELATIVE PERCENT: 0 %
GFR AFRICAN AMERICAN: >60
GFR NON-AFRICAN AMERICAN: >60
GLUCOSE BLD-MCNC: 108 MG/DL (ref 70–99)
HCT VFR BLD CALC: 38.6 % (ref 40.5–52.5)
HEMOGLOBIN: 13.3 G/DL (ref 13.5–17.5)
HIV AG/AB: NORMAL
HIV ANTIGEN: NORMAL
HIV-1 ANTIBODY: NORMAL
HIV-2 AB: NORMAL
LYMPHOCYTES ABSOLUTE: 0.7 K/UL (ref 1–5.1)
LYMPHOCYTES RELATIVE PERCENT: 10 %
MCH RBC QN AUTO: 29 PG (ref 26–34)
MCHC RBC AUTO-ENTMCNC: 34.4 G/DL (ref 31–36)
MCV RBC AUTO: 84.1 FL (ref 80–100)
MONOCYTES ABSOLUTE: 0.1 K/UL (ref 0–1.3)
MONOCYTES RELATIVE PERCENT: 2 %
NEUTROPHILS ABSOLUTE: 6.1 K/UL (ref 1.7–7.7)
NEUTROPHILS RELATIVE PERCENT: 77 %
PDW BLD-RTO: 13.7 % (ref 12.4–15.4)
PLATELET # BLD: 184 K/UL (ref 135–450)
PMV BLD AUTO: 8.7 FL (ref 5–10.5)
POTASSIUM REFLEX MAGNESIUM: 4.4 MMOL/L (ref 3.5–5.1)
PROSTATE SPECIFIC ANTIGEN: 1.57 NG/ML (ref 0–4)
RBC # BLD: 4.59 M/UL (ref 4.2–5.9)
SODIUM BLD-SCNC: 134 MMOL/L (ref 136–145)
TOTAL PROTEIN: 6 G/DL (ref 6.4–8.2)
WBC # BLD: 6.9 K/UL (ref 4–11)

## 2022-08-24 PROCEDURE — 2580000003 HC RX 258: Performed by: INTERNAL MEDICINE

## 2022-08-24 PROCEDURE — 99223 1ST HOSP IP/OBS HIGH 75: CPT | Performed by: INTERNAL MEDICINE

## 2022-08-24 PROCEDURE — 36415 COLL VENOUS BLD VENIPUNCTURE: CPT

## 2022-08-24 PROCEDURE — 85025 COMPLETE CBC W/AUTO DIFF WBC: CPT

## 2022-08-24 PROCEDURE — 84153 ASSAY OF PSA TOTAL: CPT

## 2022-08-24 PROCEDURE — 6360000004 HC RX CONTRAST MEDICATION: Performed by: INTERNAL MEDICINE

## 2022-08-24 PROCEDURE — 0202U NFCT DS 22 TRGT SARS-COV-2: CPT

## 2022-08-24 PROCEDURE — 6360000002 HC RX W HCPCS: Performed by: INTERNAL MEDICINE

## 2022-08-24 PROCEDURE — 80053 COMPREHEN METABOLIC PANEL: CPT

## 2022-08-24 PROCEDURE — 74178 CT ABD&PLV WO CNTR FLWD CNTR: CPT

## 2022-08-24 PROCEDURE — 6370000000 HC RX 637 (ALT 250 FOR IP): Performed by: INTERNAL MEDICINE

## 2022-08-24 PROCEDURE — 1200000000 HC SEMI PRIVATE

## 2022-08-24 RX ORDER — KETOROLAC TROMETHAMINE 30 MG/ML
10 INJECTION, SOLUTION INTRAMUSCULAR; INTRAVENOUS ONCE
Status: COMPLETED | OUTPATIENT
Start: 2022-08-24 | End: 2022-08-24

## 2022-08-24 RX ADMIN — ACETAMINOPHEN 650 MG: 325 TABLET, FILM COATED ORAL at 14:44

## 2022-08-24 RX ADMIN — FAMOTIDINE 20 MG: 20 TABLET ORAL at 09:36

## 2022-08-24 RX ADMIN — ENOXAPARIN SODIUM 40 MG: 100 INJECTION SUBCUTANEOUS at 09:37

## 2022-08-24 RX ADMIN — ACETAMINOPHEN 650 MG: 325 TABLET, FILM COATED ORAL at 09:36

## 2022-08-24 RX ADMIN — SODIUM CHLORIDE 3000 MG: 900 INJECTION, SOLUTION INTRAVENOUS at 14:51

## 2022-08-24 RX ADMIN — SODIUM CHLORIDE 3000 MG: 900 INJECTION, SOLUTION INTRAVENOUS at 21:27

## 2022-08-24 RX ADMIN — SODIUM CHLORIDE, PRESERVATIVE FREE 10 ML: 5 INJECTION INTRAVENOUS at 21:27

## 2022-08-24 RX ADMIN — SODIUM CHLORIDE 3000 MG: 900 INJECTION, SOLUTION INTRAVENOUS at 04:03

## 2022-08-24 RX ADMIN — SODIUM CHLORIDE, PRESERVATIVE FREE 10 ML: 5 INJECTION INTRAVENOUS at 09:37

## 2022-08-24 RX ADMIN — IOPAMIDOL 80 ML: 755 INJECTION, SOLUTION INTRAVENOUS at 22:32

## 2022-08-24 RX ADMIN — AZITHROMYCIN DIHYDRATE 500 MG: 500 INJECTION, POWDER, LYOPHILIZED, FOR SOLUTION INTRAVENOUS at 12:06

## 2022-08-24 RX ADMIN — SODIUM CHLORIDE 3000 MG: 900 INJECTION, SOLUTION INTRAVENOUS at 09:46

## 2022-08-24 RX ADMIN — FAMOTIDINE 20 MG: 20 TABLET ORAL at 21:27

## 2022-08-24 RX ADMIN — KETOROLAC TROMETHAMINE 9.9 MG: 30 INJECTION, SOLUTION INTRAMUSCULAR at 16:10

## 2022-08-24 RX ADMIN — ACETAMINOPHEN 650 MG: 325 TABLET, FILM COATED ORAL at 04:07

## 2022-08-24 RX ADMIN — SODIUM CHLORIDE, POTASSIUM CHLORIDE, SODIUM LACTATE AND CALCIUM CHLORIDE: 600; 310; 30; 20 INJECTION, SOLUTION INTRAVENOUS at 16:12

## 2022-08-24 ASSESSMENT — PAIN SCALES - GENERAL
PAINLEVEL_OUTOF10: 0
PAINLEVEL_OUTOF10: 3
PAINLEVEL_OUTOF10: 0
PAINLEVEL_OUTOF10: 0

## 2022-08-24 NOTE — PROGRESS NOTES
Urology Attending Progress Note      Subjective: Voiding without issue. Reports urine is still dark    Vitals:  BP (!) 141/86   Pulse 83   Temp 98 °F (36.7 °C) (Oral)   Resp 16   Ht 6' 8\" (2.032 m)   Wt 219 lb 5.7 oz (99.5 kg)   SpO2 99%   BMI 24.10 kg/m²   Temp  Av.3 °F (37.9 °C)  Min: 98 °F (36.7 °C)  Max: 103.2 °F (39.6 °C)    Intake/Output Summary (Last 24 hours) at 2022 0934  Last data filed at 2022 0450  Gross per 24 hour   Intake 3793.03 ml   Output 1100 ml   Net 2693.03 ml       Exam: Unable to view urine on rounds, patient appears comfortable     Labs:  WBC:    Lab Results   Component Value Date/Time    WBC 7.3 2022 03:37 AM     Hemoglobin/Hematocrit:    Lab Results   Component Value Date/Time    HGB 14.2 2022 03:37 AM    HCT 43.1 2022 03:37 AM     BMP:    Lab Results   Component Value Date/Time     2022 03:37 AM    K 4.6 2022 03:37 AM    CL 96 2022 03:37 AM    CO2 24 2022 03:37 AM    BUN 17 2022 03:37 AM    LABALBU 3.9 2022 03:37 AM    CREATININE 1.2 2022 03:37 AM    CALCIUM 9.0 2022 03:37 AM    GFRAA >60 2022 03:37 AM    GFRAA 106 2012 04:55 AM    LABGLOM >60 2022 03:37 AM     PT/INR:    Lab Results   Component Value Date/Time    PROTIME 11.9 2017 07:40 AM    INR 1.04 2017 07:40 AM     PTT:    Lab Results   Component Value Date/Time    APTT 34.0 2017 10:25 AM   [APT    Impression/Plan: 47 yo M admitted with acute viral illness. We were consulted for gross hematuria.     -No issues with voiding reported   -Urine unable to be viewed. According to chart urine is tea colored and clear without clot  -No indication for cystoscopy at this time. We will work on arranging FU outpatient cystoscopy. If urine remains clear, ok to DC from our standpoint. We will see PRN while in house.      SHAKEEL Casarez

## 2022-08-24 NOTE — PROGRESS NOTES
Patient is A&O x4.  RA, sat 96%. No complaints of pain or SOB. Respirations appear to easy and unlabored. Lungs clear. Respirations easy with no complaints of cough. No complaints of nausea/vomiting/diarrhea. Up with standby assist to the bathroom/BSC as needed. IVF infusing per right FA PIV as ordered. Patient is still febrile, tylenol and ice packs given. Tolerating regular diet. Plan of care and safety measures reviewed with the patient. Call light in reach and bed alarm in place. Will continue to monitor.   Electronically signed by Demetrcie Meier RN on 8/23/2022 at 11:44 PM

## 2022-08-24 NOTE — CONSULTS
Infectious Diseases Inpatient Consult Note    RESIDENT NOTE - reviewed / edited, attending note at bottom    Reason for Consult:   Fever  Requesting Physician:   Dr. Ash Dill MD  Primary Care Physician:  AMENA Palacios CNP  History Obtained From:   Pt, EPIC    Admit Date: 8/22/2022  Hospital Day: 3    CHIEF COMPLAINT:       Chief Complaint   Patient presents with    Tremors     Pt presents with the complaint of tremors in his extremities with weakness. Pt states she started with flu like symptoms four days ago. Pt  is unable to walk, has stuttering of the speech that is new. Pts wife states his urine appears to have blood in it. Pt is alert and oriented. Pt states he does not feel like he can walk or control his arms. HISTORY OF PRESENT ILLNESS:      Wanda Medrano is a 45 yo M w/ PMHx of HTN presents for generalized weakness. He has been week since about last Friday (8/19/22) that is associated with nausea and non-bloody, non-bilious emesis, diarrhea that is non-bloody, and a cough that is productive but unknown what color. He also reports dark urine. Patient is not-COVID vaccinated, but has tested negative x 2 for covid at home and negative here on PCR test.     Patient endorses chills, and has been febrile with temperatures as high as 103.2 dF. He is extremely weak, and while he was able to shower once, it completely drained him of his strength. He has never had something like this before. No known sick contacts.      Past Medical History:    Past Medical History:   Diagnosis Date    Arteriosclerotic coronary artery disease 1/25/2017    Benign prostatic hypertrophy 1/13/2012    Elevated blood-pressure reading without diagnosis of hypertension 3/4/2012    Essential hypertension 2/15/2016    Gastroesophageal reflux disease without esophagitis 7/31/2016    GERD (gastroesophageal reflux disease) 12/5/2011    Hyperlipidemia 7/19/2015    Hypertension 3/6/2012    Microscopic hematuria 12/7/2011       Past Surgical History:    Past Surgical History:   Procedure Laterality Date    HAMMER TOE SURGERY  2008    left foot - Dr. Trinidad Alonso      left foot    HAMMER TOE SURGERY      left foot    LAPAROSCOPIC APPENDECTOMY  March 4, 2014    Dr. Nii Shaw  March 9, 2012    L3-L4 laminectomy with microdiscectomy and facetectomy - Dr. Sana Mcbride  about 2010    [facility] and [facility] office    [address]    right wrist    WRIST GANGLION EXCISION  1989    right wrist       Current Medications:     azithromycin  500 mg IntraVENous Q24H    ampicillin-sulbactam  3,000 mg IntraVENous Q6H    sodium chloride flush  5-40 mL IntraVENous 2 times per day    enoxaparin  40 mg SubCUTAneous Daily    famotidine  20 mg Oral BID       Allergies:  Patient has no known allergies. Social History:    TOBACCO:    Never  ETOH:    Occasional  DRUGS:   none  MARITAL STATUS:     OCCUPATION:       Patient lives at home    Family History:   No immunodeficiency    REVIEW OF SYSTEMS:    No fever / has chills / sweats. No weight loss. No visual change, eye pain, eye discharge. No oral lesion, sore throat, dysphagia. Has cough / sputum. Denies chest pain, palpitations. Denies n / v / abd pain. No diarrhea. Denies dysuria or change in urinary function. Denies joint swelling or pain. No myalgia, arthralgia. Denies skin changes, itching  Denies focal weakness, sensory change or other neurologic symptom    Denies new / worse depression, psychiatric symptoms    PHYSICAL EXAM:      Vitals:    /66   Pulse 77   Temp (!) 103.2 °F (39.6 °C) (Oral)   Resp 16   Ht 6' 8\" (2.032 m)   Wt 219 lb 5.7 oz (99.5 kg)   SpO2 93%   BMI 24.10 kg/m²     GENERAL: No apparent distress.   Generally weak  HEENT: Membranes moist, no oral lesion, PERRL  NECK:  Supple, no lymphadenopathy  LUNGS: Clear b/l, no rales, no dullness  CARDIAC: RRR, no murmur appreciated  ABD:  + BS, soft / NT  EXT:  No rash, no edema, no lesions  NEURO: No focal neurologic findings  PSYCH: Orientation, sensorium, mood normal  LINES:  Peripheral iv    DATA:    Lab Results   Component Value Date    WBC 6.9 08/24/2022    HGB 13.3 (L) 08/24/2022    HCT 38.6 (L) 08/24/2022    MCV 84.1 08/24/2022     08/24/2022     Lab Results   Component Value Date    CREATININE 1.1 08/24/2022    BUN 12 08/24/2022     (L) 08/24/2022    K 4.4 08/24/2022    CL 96 (L) 08/24/2022    CO2 25 08/24/2022       Hepatic Function Panel:   Lab Results   Component Value Date/Time    ALKPHOS 92 08/24/2022 05:34 AM     08/24/2022 05:34 AM     08/24/2022 05:34 AM    PROT 6.0 08/24/2022 05:34 AM    PROT 7.4 12/05/2011 10:34 AM    BILITOT 0.5 08/24/2022 05:34 AM    BILIDIR <0.2 08/24/2022 05:34 AM    IBILI see below 08/24/2022 05:34 AM    LABALBU 3.1 08/24/2022 05:34 AM       Micro:  RSV: no result  8/22/22 BC x 2 NGTD  8/22/22 Rapid influenza A/B antigen: negative   8/23/22 COVID-19 PCR negative    Imaging:   CT ABDOMEN PELVIS W IV CONTRAST 8/22/22  1. Right lower lobe pneumonia. 2. Air-fluid levels throughout normal caliber colon. No evidence of mechanical obstruction. Appearance may relate to enteritis. IMPRESSION:      Patient Active Problem List   Diagnosis    Microscopic hematuria    Benign prostatic hyperplasia    Hyperlipidemia    Essential hypertension    Chest pain    Pure hypercholesterolemia    Hypokinesis    Diastolic dysfunction    Left ventricular hypertrophy    Arteriosclerotic coronary artery disease    Psoriasis    Toenail fungus    Generalized weakness    Acute viral syndrome    Transaminitis    Hyponatremia    Sepsis ([address]       Rylan Wolfe is a 45 yo M with a febrile illness causing chills, associated with diarrhea, cough, and dysuria.      RECOMMENDATIONS:    Patient is currently on Unasyn and

## 2022-08-24 NOTE — CARE COORDINATION
Case Management Assessment           Initial Evaluation                Date / Time of Evaluation: 8/24/2022 12:59 PM                 Assessment Completed by: KASSANDRA Garcia    Patient Name: Ethridge Cushing     YOB: 1970  Diagnosis: Acute viral syndrome [B34.9]  Sepsis [facility]) [A41.9]     Date / Time: 8/22/2022 12:37 PM    Patient Admission Status: Inpatient    If patient is discharged prior to next notation, then this note serves as note for discharge by case management. Current PCP: AMENA Otero - CNP  Clinic Patient: No    Chart Reviewed: Yes  Patient/ Family Interviewed: Yes    Initial assessment completed at bedside with: patient and family member    Hospitalization in the last 30 days: No    Emergency Contacts:  Extended Emergency Contact Information  Primary Emergency Contact: Willam Davies  Address: [address] [place] [address] Phone: [phone]  Mobile Phone: [phone]  Relation: Spouse    Advance Directives:   Code Status: Full [address]: No  Agent:   Contact Number:     Copy present: No     In paper Chart: No    Scanned into EMR No    Financial  Payor: [facility] / Plan: BCBS - [place] PPO / Product Type: *No Product type* /     Pre-cert required for SNF: Yes    Pharmacy    [facility] [address]  Phone: [phone] Fax: [facility] [place] [address] [phone] Kaleb Beasley [phone]  [place]  [phone]  Phone: [phone] Fax: [phone]      Potential assistance Purchasing Medications: Potential Assistance Purchasing Medications: No  Does Patient want to participate in local refill/ meds to beds program?:      Meds To Beds General Rules:  1.  Can ONLY be done Monday- Friday between 8: 30am-5pm  2. Prescription(s) must be in pharmacy by 3pm to be filled same day  3. Copy of patient's insurance/ prescription drug card and patient face sheet must be sent along with the prescription(s)  4. Cost of Rx cannot be added to hospital bill. If financial assistance is needed, please contact unit  or ;  or  CANNOT provide pharmacy voucher for patients co-pays  5.  Patients can then  the prescription on their way out of the hospital at discharge, or pharmacy can deliver to the bedside if staff is available. (payment due at time of pick-up or delivery - cash, check, or card accepted)     Able to afford home medications/ co-pay costs: Yes    ADLS  Support Systems: Spouse/Significant Other    PT AM-PAC:   /24  OT AM-PAC:   /24    [place]: lives in two story home  Steps: one flight of stairs    Plans to RETURN to current housing: Yes  Barriers to RETURNING to current housing: none    [address]  Currently ACTIVE with [address]: No  Home Care Agency: Not Applicable    Currently ACTIVE with [place] on Aging: No  Passport/ Waiver: No  Passport/ Waiver Services: Not Applicable    :  Phone:       [phone]  [facility] Provider: none  Equipment: none    Home Oxygen and [address] [place] prior to admission: No  [address]: Not Applicable  Other Respiratory Equipment: n/a    Informed of need to bring portable home O2 tank on day of DISCHARGE for nursing to connect prior to leaving: No  Verbalized agreement/Understanding: No  Person to bring portable tank at discharge: n/a    Dialysis  Active with HD/PD prior to admission: No  Nephrologist: [address]  Not Applicable    DISCHARGE PLAN:  Disposition: Home- No Services Needed    Transportation PLAN for discharge: family     Factors facilitating achievement of predicted outcomes: Family support    Barriers to discharge: IV ABX, urology following    Additional Case Management Notes: Patient lives in a two story home with his wife. Patient has no current [facility] [place] [address] services at home. Patient expects to discharge home with no CM needs. Patient's wife will provide transportation home at discharge. The Plan for Transition of Care is related to the following treatment goals of Acute viral syndrome [B34.9]  Sepsis ([address] [A41.9]    The Patient and/or patient representative Jackie Connelly and his family were provided with a choice of provider and agrees with the discharge plan Yes    Freedom of choice list was provided with basic dialogue that supports the patient's individualized plan of care/goals and shares the quality data associated with the providers.  Yes    Care Transition patient: No    KASSANDRA Paul  [facility]  Case Management Department  Ph: [phone]   Fax: [phone]

## 2022-08-24 NOTE — PROGRESS NOTES
Progress Note  PGY-3    Admit Date: 8/22/2022  Hospital Day: 3  Diet: ADULT DIET; Regular; Low Fat/Low Chol/High Fiber/2 gm Na  Code Status: Full Code       Interval history:  Patient was seen and examined this morning. Patient was febrile overnight with T axis of 103.2 °F.      Patient endorses fever. He also endorses nonbloody diarrhea and hematuria. Patient denies fevers, chills, chest pain, shortness of breath, nausea, vomiting, diarrhea, or constipation. Medications:   Scheduled Meds:   azithromycin  500 mg IntraVENous Q24H    ampicillin-sulbactam  3,000 mg IntraVENous Q6H    sodium chloride flush  5-40 mL IntraVENous 2 times per day    enoxaparin  40 mg SubCUTAneous Daily    famotidine  20 mg Oral BID       Continuous Infusions:   lactated ringers Stopped (08/24/22 0405)       PRN Meds:  acetaminophen **OR** acetaminophen, sodium chloride flush, ondansetron **OR** ondansetron, polyethylene glycol, aluminum & magnesium hydroxide-simethicone    Vital/I&O/Physical examination:   VS:  /67   Pulse 75   Temp 99.5 °F (37.5 °C) (Oral)   Resp 18   Ht 6' 8\" (2.032 m)   Wt 219 lb 5.7 oz (99.5 kg)   SpO2 97%   BMI 24.10 kg/m²     I/O:    Intake/Output Summary (Last 24 hours) at 8/24/2022 0855  Last data filed at 8/24/2022 0450  Gross per 24 hour   Intake 3793.03 ml   Output 1300 ml   Net 2493.03 ml       PE:  Physical Exam  Vitals reviewed. Constitutional:       Appearance: Normal appearance. HENT:      Head: Normocephalic and atraumatic. Nose: Nose normal.   Eyes:      Extraocular Movements: Extraocular movements intact. Conjunctiva/sclera: Conjunctivae normal.      Pupils: Pupils are equal, round, and reactive to light. Cardiovascular:      Rate and Rhythm: Normal rate and regular rhythm. Pulses: Normal pulses. Heart sounds: Normal heart sounds. Pulmonary:      Effort: Pulmonary effort is normal.      Breath sounds: Normal breath sounds.    Abdominal:      Palpations: with PMH of of hypertension presented with chief complaint of generalized weakness. CAP:  -Patient presented with chief complaint of generalized weakness. He also endorsed fever, chills, nausea, vomiting, nonbloody diarrhea, and decreased oral intake. - COVID test and influenza was negative.  -Respiratory viral panel pending.  -Blood culture showed no growth to date.  - CT scanning showed Right lower lobe pneumonia. -IV fluid  -Continue Unasyn and azithromycin. Acute viral syndrome:  -Patient presented with chief complaint of fever, nausea, vomiting, decreased oral intake, chills, nonbloody diarrhea, decreased oral intake. - COVID and influenza test were negative. - Respiratory viral panel pending  - IV fluid    Hyponatremia:  -Secondary to most likely hypovolemic hyponatremia.  - IV fluid  - Monitor sodium    Microscopic hematuria:  - UA showed large blood with minimal RBCs  - CK lower than thousand  - Urology on board. Follow-up outpatient with urologist possible cystoscopy. Elevated liver enzyme:  -Secondary to suspected viral infection, terbinafine  - LFT is improving  - Monitor LFT      Hyperlipidemia:  -Holding atorvastatin for now. Hypertension  -Holding home lisinopril for now. Benign prostatic hyperplasia  -Holding Rapaflo for now      Code Status: Full Code  ADULT DIET;  Regular; Low Fat/Low Chol/High Fiber/2 gm Na  PPX: Lovenox     This patient will be discussed with attending, Josselyn Doherty MD.    Giovany Pittman MD, PGY- 3  Contact via [employer]  8/24/2022,  8:55 AM

## 2022-08-25 LAB
A/G RATIO: 1.1 (ref 1.1–2.2)
ALBUMIN SERPL-MCNC: 3 G/DL (ref 3.4–5)
ALP BLD-CCNC: 99 U/L (ref 40–129)
ALT SERPL-CCNC: 216 U/L (ref 10–40)
ANION GAP SERPL CALCULATED.3IONS-SCNC: 12 MMOL/L (ref 3–16)
AST SERPL-CCNC: 173 U/L (ref 15–37)
BASOPHILS ABSOLUTE: 0 K/UL (ref 0–0.2)
BASOPHILS RELATIVE PERCENT: 0.6 %
BILIRUB SERPL-MCNC: 0.4 MG/DL (ref 0–1)
BUN BLDV-MCNC: 13 MG/DL (ref 7–20)
CALCIUM SERPL-MCNC: 8.1 MG/DL (ref 8.3–10.6)
CHLORIDE BLD-SCNC: 95 MMOL/L (ref 99–110)
CO2: 25 MMOL/L (ref 21–32)
CREAT SERPL-MCNC: 1.1 MG/DL (ref 0.9–1.3)
EOSINOPHILS ABSOLUTE: 0.1 K/UL (ref 0–0.6)
EOSINOPHILS RELATIVE PERCENT: 1 %
GFR AFRICAN AMERICAN: >60
GFR NON-AFRICAN AMERICAN: >60
GLUCOSE BLD-MCNC: 98 MG/DL (ref 70–99)
HCT VFR BLD CALC: 37.5 % (ref 40.5–52.5)
HEMOGLOBIN: 12.8 G/DL (ref 13.5–17.5)
LYMPHOCYTES ABSOLUTE: 1.3 K/UL (ref 1–5.1)
LYMPHOCYTES RELATIVE PERCENT: 20.7 %
MCH RBC QN AUTO: 28.8 PG (ref 26–34)
MCHC RBC AUTO-ENTMCNC: 34.1 G/DL (ref 31–36)
MCV RBC AUTO: 84.5 FL (ref 80–100)
MONOCYTES ABSOLUTE: 0.4 K/UL (ref 0–1.3)
MONOCYTES RELATIVE PERCENT: 5.9 %
NEUTROPHILS ABSOLUTE: 4.4 K/UL (ref 1.7–7.7)
NEUTROPHILS RELATIVE PERCENT: 71.8 %
PDW BLD-RTO: 13.7 % (ref 12.4–15.4)
PLATELET # BLD: 185 K/UL (ref 135–450)
PMV BLD AUTO: 8.3 FL (ref 5–10.5)
POTASSIUM REFLEX MAGNESIUM: 4.2 MMOL/L (ref 3.5–5.1)
PROCALCITONIN: 1.21 NG/ML (ref 0–0.15)
RBC # BLD: 4.44 M/UL (ref 4.2–5.9)
REPORT: NORMAL
RESPIRATORY PANEL PCR: NORMAL
SODIUM BLD-SCNC: 132 MMOL/L (ref 136–145)
TOTAL PROTEIN: 5.7 G/DL (ref 6.4–8.2)
WBC # BLD: 6.2 K/UL (ref 4–11)

## 2022-08-25 PROCEDURE — 6370000000 HC RX 637 (ALT 250 FOR IP): Performed by: INTERNAL MEDICINE

## 2022-08-25 PROCEDURE — 80053 COMPREHEN METABOLIC PANEL: CPT

## 2022-08-25 PROCEDURE — 99233 SBSQ HOSP IP/OBS HIGH 50: CPT | Performed by: INTERNAL MEDICINE

## 2022-08-25 PROCEDURE — 2580000003 HC RX 258: Performed by: INTERNAL MEDICINE

## 2022-08-25 PROCEDURE — 85025 COMPLETE CBC W/AUTO DIFF WBC: CPT

## 2022-08-25 PROCEDURE — 87449 NOS EACH ORGANISM AG IA: CPT

## 2022-08-25 PROCEDURE — 84145 PROCALCITONIN (PCT): CPT

## 2022-08-25 PROCEDURE — 87385 HISTOPLASMA CAPSUL AG IA: CPT

## 2022-08-25 PROCEDURE — 94761 N-INVAS EAR/PLS OXIMETRY MLT: CPT

## 2022-08-25 PROCEDURE — 1200000000 HC SEMI PRIVATE

## 2022-08-25 PROCEDURE — 6360000002 HC RX W HCPCS: Performed by: INTERNAL MEDICINE

## 2022-08-25 PROCEDURE — 94150 VITAL CAPACITY TEST: CPT

## 2022-08-25 PROCEDURE — 36415 COLL VENOUS BLD VENIPUNCTURE: CPT

## 2022-08-25 PROCEDURE — 87506 IADNA-DNA/RNA PROBE TQ 6-11: CPT

## 2022-08-25 RX ADMIN — SODIUM CHLORIDE 3000 MG: 900 INJECTION, SOLUTION INTRAVENOUS at 20:18

## 2022-08-25 RX ADMIN — SODIUM CHLORIDE 3000 MG: 900 INJECTION, SOLUTION INTRAVENOUS at 14:40

## 2022-08-25 RX ADMIN — SODIUM CHLORIDE 3000 MG: 900 INJECTION, SOLUTION INTRAVENOUS at 08:44

## 2022-08-25 RX ADMIN — ENOXAPARIN SODIUM 40 MG: 100 INJECTION SUBCUTANEOUS at 08:44

## 2022-08-25 RX ADMIN — ACETAMINOPHEN 650 MG: 325 TABLET, FILM COATED ORAL at 18:25

## 2022-08-25 RX ADMIN — SODIUM CHLORIDE 3000 MG: 900 INJECTION, SOLUTION INTRAVENOUS at 04:58

## 2022-08-25 RX ADMIN — SODIUM CHLORIDE, POTASSIUM CHLORIDE, SODIUM LACTATE AND CALCIUM CHLORIDE: 600; 310; 30; 20 INJECTION, SOLUTION INTRAVENOUS at 01:37

## 2022-08-25 RX ADMIN — ACETAMINOPHEN 650 MG: 325 TABLET, FILM COATED ORAL at 05:04

## 2022-08-25 RX ADMIN — AZITHROMYCIN DIHYDRATE 500 MG: 500 INJECTION, POWDER, LYOPHILIZED, FOR SOLUTION INTRAVENOUS at 09:38

## 2022-08-25 ASSESSMENT — PAIN SCALES - GENERAL
PAINLEVEL_OUTOF10: 0

## 2022-08-25 NOTE — PROGRESS NOTES
Physician Progress Note      aHlley Jon  [facility] #:                  [id number]  :                       1970  ADMIT DATE:       2022 12:37 PM  [address] [place] DATE:  RESPONDING  PROVIDER #:        Rafia Allen MD          QUERY TEXT:    Patient admitted with weakness, tremors, viral syndrome, noted to have   pneumonia per CT and be on antibiotics. If possible, please document in   progress notes and discharge summary if you are evaluating and/or treating any   of the following: The medical record reflects the following:  Risk Factors: 47 yo w/ weakness, flu like symptoms,  Clinical Indicators: Per ED: Pneumonia of right lower lobe due to infectious   organism. Per PN : RLL infiltrate With fever will treat with abx. Start   Azithromycin and Unasyn. CT :  Right lower lobe pneumonia. Per ID :   Have considered Resp infection, poss Legionella, GI infection as etiology. Treatment: CXR, CT, antibiotics as above. Options provided:  -- Pneumonia  -- No pneumonia  -- Other - I will add my own diagnosis  -- Disagree - Not applicable / Not valid  -- Disagree - Clinically unable to determine / Unknown  -- Refer to Clinical Documentation Reviewer    PROVIDER RESPONSE TEXT:    This patient has pneumonia. Query created by: Ely Saldivar on 2022 7:34 AM      QUERY TEXT:    Pt admitted with weakness, viral syndrome, nausea, vomiting, diarrhea. Pt   noted to have increased temperature and HR. If possible, please document in   the progress notes and discharge summary if you are evaluating and /or   treating any of the following: The medical record reflects the following:  Risk Factors: 47 yo w/ weakness, fatigue, tremors  Clinical Indicators: Per ED:   On initial assessment ill-appearing, with   rigors. Per PN  active problems list:   Sepsis. Temp 103.1, HR 92,   procalcitonin 1.78.   Treatment: Procalcitonin, blood cultures, lactic acid, IVF bolus, IV Unasyn,   IV Zithromax. Options provided:  -- Sepsis, present on admission  -- Viral syndrome without Sepsis  -- Other - I will add my own diagnosis  -- Disagree - Not applicable / Not valid  -- Disagree - Clinically unable to determine / Unknown  -- Refer to Clinical Documentation Reviewer    PROVIDER RESPONSE TEXT:    This patient has sepsis which was present on admission.     Query created by: Mildred Olivas on 8/24/2022 7:41 AM      Electronically signed by:  Venancio Martinez MD 8/25/2022 2:21 PM

## 2022-08-25 NOTE — PROGRESS NOTES
Progress Note  PGY-3    Admit Date: 8/22/2022  Hospital Day: 4  Diet: ADULT DIET; Regular; Low Fat/Low Chol/High Fiber/2 gm Na  Code Status: Full Code       Interval history:  Patient was seen and examined this morning. Patient was febrile overnight with T axis of 103.2 °F yesterday afternoon. Patient endorses fever and chills. Mentions that his diarrhea has been resolved. He does not have more episodes of hematuria. Denies chest pain, shortness of breath, nausea, vomiting, diarrhea, or constipation. Medications:   Scheduled Meds:   azithromycin  500 mg IntraVENous Q24H    ampicillin-sulbactam  3,000 mg IntraVENous Q6H    sodium chloride flush  5-40 mL IntraVENous 2 times per day    enoxaparin  40 mg SubCUTAneous Daily    famotidine  20 mg Oral BID       Continuous Infusions:   lactated ringers Stopped (08/25/22 0457)       PRN Meds:  acetaminophen **OR** acetaminophen, sodium chloride flush, ondansetron **OR** ondansetron, polyethylene glycol, aluminum & magnesium hydroxide-simethicone    Vital/I&O/Physical examination:   VS:  BP (!) 164/103   Pulse 71   Temp (!) 102.2 °F (39 °C) (Oral)   Resp 18   Ht 6' 8\" (2.032 m)   Wt 219 lb 5.7 oz (99.5 kg)   SpO2 93%   BMI 24.10 kg/m²     I/O:    Intake/Output Summary (Last 24 hours) at 8/25/2022 0828  Last data filed at 8/25/2022 0531  Gross per 24 hour   Intake 2276.26 ml   Output 300 ml   Net 1976.26 ml         PE:  Physical Exam  Vitals reviewed. Constitutional:       Appearance: Normal appearance. HENT:      Head: Normocephalic and atraumatic. Nose: Nose normal.   Eyes:      Extraocular Movements: Extraocular movements intact. Conjunctiva/sclera: Conjunctivae normal.      Pupils: Pupils are equal, round, and reactive to light. Cardiovascular:      Rate and Rhythm: Normal rate and regular rhythm. Pulses: Normal pulses. Heart sounds: Normal heart sounds.    Pulmonary:      Effort: Pulmonary effort is normal.      Breath sounds: Normal breath sounds. Abdominal:      Palpations: Abdomen is soft. Musculoskeletal:         General: Normal range of motion. Cervical back: Normal range of motion and neck supple. Neurological:      General: No focal deficit present. Mental Status: He is alert and oriented to person, place, and time. Labs & Imaging:   LABS:  Renal:   Recent Labs     08/23/22  0337 08/24/22  0534 08/25/22  0532   * 134* 132*   K 4.6 4.4 4.2   CL 96* 96* 95*   CO2 24 25 25   BUN 17 12 13   CREATININE 1.2 1.1 1.1   GLUCOSE 129* 108* 98   ANIONGAP 14 13 12       CBC:   Recent Labs     08/23/22  0337 08/24/22  0534 08/25/22  0532   WBC 7.3 6.9 6.2   HGB 14.2 13.3* 12.8*   HCT 43.1 38.6* 37.5*    184 185   MCV 86.3 84.1 84.5                              Hepatic:   Recent Labs     08/23/22 0337 08/24/22  0534 08/25/22  0532   * 216* 173*   * 282* 216*   BILITOT 0.8 0.5 0.4   ALKPHOS 84 92 99       Troponin: No results for input(s): TROPONINI in the last 72 hours. BNP: No results for input(s): BNP in the last 72 hours. Lipids: No results for input(s): CHOL, HDL in the last 72 hours. Invalid input(s): LDLCALCU, TRIGLYCERIDE  INR: No results for input(s): INR in the last 72 hours. Lactate: No results for input(s): LACTATE in the last 72 hours. ABGs:No results for input(s): PHART, UKW0BJT, PO2ART, BTM7LTL, BEART, THGBART, W8JVJZWT, ISF8TEO in the last 72 hours. UA:  Recent Labs     08/23/22  1430   COLORU Yellow   PHUR 6.5   WBCUA None seen   RBCUA 11-20*   MUCUS Rare*   BACTERIA 3+*   CLARITYU Clear   SPECGRAV 1.025   LEUKOCYTESUR Negative   UROBILINOGEN 2.0*   BILIRUBINUR SMALL*   BLOODU LARGE*   GLUCOSEU Negative          IMAGING:  CT ABDOMEN PELVIS W WO CONTRAST Additional Contrast? Oral   Final Result      Increased dense right lower lobe consolidation concerning for pneumonia. No acute abdominopelvic abnormality. INCIDENTAL FINDINGS: None.       CT ABDOMEN PELVIS W IV 8:28 AM

## 2022-08-25 NOTE — PROGRESS NOTES
ID Follow-up NOTE  RESIDENT NOTE - reviewed / edited, attending note at bottom    CC:   Fever, Pneumonia  Antibiotics: Unasyn, Azithromycin    Admit Date: 8/22/2022  Hospital Day: 4    Subjective:     Patient is a 45 yo M w/ PMHx of HTN  Few days weakness, loose and watery stools, nausea w/ emesis  Cough, sometimes productive  Febrile    Objective:     Patient Vitals for the past 8 hrs:   BP Temp Temp src Pulse Resp SpO2   08/25/22 0837 137/80 98.7 °F (37.1 °C) Oral 68 16 96 %   08/25/22 0459 (!) 164/103 (!) 102.2 °F (39 °C) Oral 71 18 93 %     I/O last 3 completed shifts: In: 5829.3 [P.O.:1000; I.V.:3810; IV Piggyback:1019.3]  Out: 700 [Urine:700]  I/O this shift:  In: -   Out: 550 [Urine:550]    EXAM:  GENERAL: No apparent distress.  Generally Weak  HEENT: Membranes moist, no oral lesion  NECK:  Supple, no lymphadenopathy  LUNGS: Clear b/l, no rales, no dullness  CARDIAC: RRR, no murmur appreciated  ABD:  + BS, soft / NT  EXT:  No rash, no edema, no lesions  NEURO: No focal neurologic findings  PSYCH: Orientation, sensorium, mood normal  LINES:  Peripheral iv       Data Review:  Lab Results   Component Value Date    WBC 6.2 08/25/2022    HGB 12.8 (L) 08/25/2022    HCT 37.5 (L) 08/25/2022    MCV 84.5 08/25/2022     08/25/2022     Lab Results   Component Value Date    CREATININE 1.1 08/25/2022    BUN 13 08/25/2022     (L) 08/25/2022    K 4.2 08/25/2022    CL 95 (L) 08/25/2022    CO2 25 08/25/2022       Hepatic Function Panel:   Lab Results   Component Value Date/Time    ALKPHOS 99 08/25/2022 05:32 AM     08/25/2022 05:32 AM     08/25/2022 05:32 AM    PROT 5.7 08/25/2022 05:32 AM    PROT 7.4 12/05/2011 10:34 AM    BILITOT 0.4 08/25/2022 05:32 AM    BILIDIR <0.2 08/24/2022 05:34 AM    IBILI see below 08/24/2022 05:34 AM    LABALBU 3.0 08/25/2022 05:32 AM       MICRO:  RSV: no result  8/22/22 BC x 2 NGTD  8/22/22 Rapid influenza A/B antigen: negative   8/23/22 COVID-19 PCR negative  8/24/22 Resp Panel w/ COVID: awaiting  8/24/22 Legionella antigen, urine: awaiting  8/24/22 Strep Pneumoniae antigen: awaiting  8/24/22 GI Bacterial Pathogens by PCR/Stool: awaiting    IMAGING:  CT ABDOMEN PELVIS W WO CONTRAST 8/24/22  Increased dense right lower lobe consolidation concerning for pneumonia. No acute abdominopelvic abnormality. CT ABDOMEN PELVIS W IV CONTRAST 8/22/22  1. Right lower lobe pneumonia. 2. Air-fluid levels throughout normal caliber colon. No evidence of mechanical obstruction. Appearance may relate to enteritis. Scheduled Meds:   azithromycin  500 mg IntraVENous Q24H    ampicillin-sulbactam  3,000 mg IntraVENous Q6H    sodium chloride flush  5-40 mL IntraVENous 2 times per day    enoxaparin  40 mg SubCUTAneous Daily    famotidine  20 mg Oral BID       PRN Meds:  acetaminophen **OR** acetaminophen, sodium chloride flush, ondansetron **OR** ondansetron, polyethylene glycol, aluminum & magnesium hydroxide-simethicone      Assessment:     Davi Dia is a 45 yo M who is usually healthy bu now has a febrile illness of unknown origin, likely Pulmonary or GI in etiology. Plan:     Continue w/ Antibiotics, awaiting further infectious testing results including Legionella and GI pathogens     Dr. Jack Horvath as below. Medical Decision Making: The following items were considered in medical decision making:  Discussion of patient care with other providers  Reviewed clinical lab tests  Reviewed radiology tests  Reviewed other diagnostic tests/interventions  Independent review of radiologic images  Microbiology cultures and other micro tests reviewed      Discussed with Dr Ap Smith MD     Addendum to Resident Progress note:  Pt seen, examined and evaluated. I have reviewed the current history, physical findings, labs and assessment and plan and agree with note as documented by resident (Dr. Katty Pino).      45 yo man with hx HTN  Presents with few days weakness, loose stool, nausea & vomiting  Stool, watery, not bloody. Not assoc abd pain, cramps. Occasional cough, mostly non-productive. No new exposure - no travel, camping, zoonotic contacts. No sick contact or others with similar illness     In ED 8/22, T 101.4, WBC 9.3 - 80%PMN, Cr 1.2,  /   Procalcitonin 1.78  BC sent. CXR neg. Abd / pelvic CT with RLL density (see chest CT image above)  SARS CoV-2 PCR neg  HIV screen neg    Admit, started on Unasyn and Azithromycin. Seen / consult 8/24 - ongoing fever    Today 8/25, pt feels better, has non-productive cough, has appetite. No stools  Tm 102.2. WBC 6.2  R base rales, abd benign  WBC 6.2, f/u Procal 1.21     IMP/  Febrile illness   - diarrhea, cough  - elevated LFT    RLL pneumonia / consolidation  - R base density, worse on f/u imaging     Have considered Resp infection, poss Legionella microbial etiology. REC/  Cont empiric - Unasyn + Azithromycin  Will f/u on BC results - no growth to date  Await Legionella / Pneumococcal / HIstoplasma urinary antigen    If cont improvement, discharge on oral resp quinolone eg Levofloxacin 750 mg daily     Medical Decision Making: The following items were considered in medical decision making:  Discussion of patient care with other providers  Reviewed clinical lab tests  Reviewed radiology tests  Reviewed other diagnostic tests/interventions  Independent review of radiologic images - reviewed CTs  Microbiology cultures and other micro tests reviewed       Risk of Complications/Morbidity: High   Illness(es)/ Infection present that pose threat to bodily function. There is potential for severe exacerbation of infection/side effects of treatment. Therapy requires intensive monitoring for antimicrobial agent toxicity     Discussed with pt, wife (on phone / speaker), Attending - Dr Rainer Dillard.  Pul - Dr Pancho Sotelo MD

## 2022-08-25 NOTE — PROGRESS NOTES
After viral panel test sent chart flagged as COVID rule out. Patient has had a COVID PCR test in house which was negative already. Spoke with RN supervisor, will not be moving patient at this time.   Electronically signed by Lashawn Austin RN on 8/25/2022 at 1:32 AM

## 2022-08-25 NOTE — PROGRESS NOTES
Patient is A&O x4.  RA, sat 92%. No complaints of pain or SOB. Respirations appear to easy and unlabored. Lungs clear but very diminished. Respirations easy with c/o non-productive cough. O2 sats trending down, IS given. Will obtain order. No complaints of nausea/vomiting/diarrhea. Up ad raeann to bathroom. IVF infusing per right FA PIV as ordered. Afebrile at this time. Tolerating regular diet. Plan of care and safety measures reviewed with the patient. Call light in reach. Will continue to monitor.   Electronically signed by Demetrice Meier RN on 8/25/2022 at 12:34 AM

## 2022-08-26 VITALS
HEART RATE: 65 BPM | RESPIRATION RATE: 16 BRPM | BODY MASS INDEX: 25.38 KG/M2 | SYSTOLIC BLOOD PRESSURE: 147 MMHG | HEIGHT: 78 IN | WEIGHT: 219.36 LBS | OXYGEN SATURATION: 97 % | DIASTOLIC BLOOD PRESSURE: 94 MMHG | TEMPERATURE: 98.4 F

## 2022-08-26 LAB
A/G RATIO: 1.2 (ref 1.1–2.2)
ALBUMIN SERPL-MCNC: 2.9 G/DL (ref 3.4–5)
ALP BLD-CCNC: 103 U/L (ref 40–129)
ALT SERPL-CCNC: 201 U/L (ref 10–40)
ANION GAP SERPL CALCULATED.3IONS-SCNC: 13 MMOL/L (ref 3–16)
AST SERPL-CCNC: 181 U/L (ref 15–37)
BASOPHILS ABSOLUTE: 0 K/UL (ref 0–0.2)
BASOPHILS RELATIVE PERCENT: 0.3 %
BILIRUB SERPL-MCNC: 0.4 MG/DL (ref 0–1)
BLOOD CULTURE, ROUTINE: NORMAL
BUN BLDV-MCNC: 10 MG/DL (ref 7–20)
CALCIUM SERPL-MCNC: 8.2 MG/DL (ref 8.3–10.6)
CHLORIDE BLD-SCNC: 99 MMOL/L (ref 99–110)
CO2: 25 MMOL/L (ref 21–32)
CREAT SERPL-MCNC: 1 MG/DL (ref 0.9–1.3)
CULTURE, BLOOD 2: NORMAL
EOSINOPHILS ABSOLUTE: 0.2 K/UL (ref 0–0.6)
EOSINOPHILS RELATIVE PERCENT: 2.7 %
GFR AFRICAN AMERICAN: >60
GFR NON-AFRICAN AMERICAN: >60
GI BACTERIAL PATHOGENS BY PCR: NORMAL
GLUCOSE BLD-MCNC: 111 MG/DL (ref 70–99)
HCT VFR BLD CALC: 38.2 % (ref 40.5–52.5)
HEMOGLOBIN: 12.9 G/DL (ref 13.5–17.5)
HISTOPLASMA ANTIGEN URINE INTERP: NOT DETECTED
HISTOPLASMA ANTIGEN URINE: NOT DETECTED NG/ML
L. PNEUMOPHILA SEROGP 1 UR AG: NORMAL
LYMPHOCYTES ABSOLUTE: 1.2 K/UL (ref 1–5.1)
LYMPHOCYTES RELATIVE PERCENT: 19 %
MCH RBC QN AUTO: 28.7 PG (ref 26–34)
MCHC RBC AUTO-ENTMCNC: 33.8 G/DL (ref 31–36)
MCV RBC AUTO: 84.9 FL (ref 80–100)
MONOCYTES ABSOLUTE: 0.4 K/UL (ref 0–1.3)
MONOCYTES RELATIVE PERCENT: 6.6 %
NEUTROPHILS ABSOLUTE: 4.5 K/UL (ref 1.7–7.7)
NEUTROPHILS RELATIVE PERCENT: 71.4 %
PDW BLD-RTO: 13.9 % (ref 12.4–15.4)
PLATELET # BLD: 205 K/UL (ref 135–450)
PMV BLD AUTO: 8.3 FL (ref 5–10.5)
POTASSIUM REFLEX MAGNESIUM: 3.9 MMOL/L (ref 3.5–5.1)
RBC # BLD: 4.49 M/UL (ref 4.2–5.9)
SODIUM BLD-SCNC: 137 MMOL/L (ref 136–145)
STREP PNEUMONIAE ANTIGEN, URINE: NORMAL
TOTAL PROTEIN: 5.4 G/DL (ref 6.4–8.2)
WBC # BLD: 6.3 K/UL (ref 4–11)

## 2022-08-26 PROCEDURE — 6360000002 HC RX W HCPCS: Performed by: INTERNAL MEDICINE

## 2022-08-26 PROCEDURE — 36415 COLL VENOUS BLD VENIPUNCTURE: CPT

## 2022-08-26 PROCEDURE — 85025 COMPLETE CBC W/AUTO DIFF WBC: CPT

## 2022-08-26 PROCEDURE — 6370000000 HC RX 637 (ALT 250 FOR IP): Performed by: INTERNAL MEDICINE

## 2022-08-26 PROCEDURE — 99232 SBSQ HOSP IP/OBS MODERATE 35: CPT | Performed by: INTERNAL MEDICINE

## 2022-08-26 PROCEDURE — 80053 COMPREHEN METABOLIC PANEL: CPT

## 2022-08-26 PROCEDURE — 2580000003 HC RX 258: Performed by: INTERNAL MEDICINE

## 2022-08-26 RX ORDER — GREEN TEA/HOODIA GORDONII 315-12.5MG
1 CAPSULE ORAL 2 TIMES DAILY
Qty: 20 TABLET | Refills: 0 | Status: SHIPPED | OUTPATIENT
Start: 2022-08-26 | End: 2022-09-05

## 2022-08-26 RX ORDER — LEVOFLOXACIN 750 MG/1
750 TABLET ORAL DAILY
Qty: 10 TABLET | Refills: 0 | Status: SHIPPED | OUTPATIENT
Start: 2022-08-26 | End: 2022-09-05

## 2022-08-26 RX ADMIN — ACETAMINOPHEN 650 MG: 325 TABLET, FILM COATED ORAL at 03:02

## 2022-08-26 RX ADMIN — ENOXAPARIN SODIUM 40 MG: 100 INJECTION SUBCUTANEOUS at 10:20

## 2022-08-26 RX ADMIN — SODIUM CHLORIDE 3000 MG: 900 INJECTION, SOLUTION INTRAVENOUS at 10:20

## 2022-08-26 RX ADMIN — FAMOTIDINE 20 MG: 20 TABLET ORAL at 10:21

## 2022-08-26 RX ADMIN — SODIUM CHLORIDE 3000 MG: 900 INJECTION, SOLUTION INTRAVENOUS at 02:58

## 2022-08-26 NOTE — DISCHARGE SUMMARY
INTERNAL MEDICINE DEPARTMENT AT [address] [place] [address]  DISCHARGE SUMMARY    Patient ID: Madelaine Diane                                             Discharge Date: 8/26/2022   Patient's PCP: AMENA Bassett - CNP                                          Discharge Physician: Howie Andrea MD MD  Admit Date: 8/22/2022   Admitting Physician: Jackie Cummins MD    DISCHARGE DIAGNOSES:  -Sepsis  - Acute viral syndrome  - Pneumonia  - Hyponatremia  - Microscopic hematuria  - Elevated liver enzyme  - Hyperlipidemia  - Hypertension  - Benign prostatic hyperplasia    History:  Patient is 46 y.o. male non-smoker with history of hypertension who presented to [facility] for generalized weakness to the point that he is unable to get around the house without help. He reported 4 days of flulike symptoms including subjective fevers, chills, nausea, vomiting, nonbloody, watery diarrhea with decreased p.o. intake. He also reported dark urine as well as dysuria. He denied any abdominal pain. He tested COVID-negative x2 at home. He is not vaccinated against COVID either. He denied any headaches, lightheadedness, syncope or falls. He also denied any shortness of breath, chest pain or cough. In the ED, temperature 100.7, respiratory rate 18, heart rate 90, blood pressure 144/70, SPO2 94% on room air. Labs were significant for sodium 130, , , , sed rate 88. Rapid flu and COVID were negative. UA was positive for gross hematuria. Chest x-ray was done and showed no acute cardiopulmonary process. CT showed Right lower lobe pneumonia. Patient was a started on Unasyn and azithromycin. ID was consulted. Legionella antigen, strep pneumo, GI bacterial pathogen were negative. Blood culture showed no growth. Hyponatremia:  -Secondary to most likely hypovolemic hyponatremia. Resolved with IV fluid. Microscopic hematuria:  - UA showed large blood with minimal RBCs. CK was lower than thousand. Urology was consulted. Urologist recommended follow-up outpatient with urologist possible cystoscopy. Elevated liver enzyme:  -Secondary most likely terbinafine. Terbinafine will help. LFT improving. Hyperlipidemia:  -Held atorvastatin since LFT were elevated. Hypertension  - Held home lisinopril for now. Benign prostatic hyperplasia  -Holding Rapaflo for now    Patient endorses fever and chills. Mentions that his diarrhea has been resolved. He does not have more episodes of hematuria. Denies chest pain, shortness of breath, nausea, vomiting, diarrhea, or constipation. Patient is a stable and is being discharged home. He will follow-up with his primary care physician and urologist.  He will be discharged with levofloxacin for 10 days. Physical Exam:  BP (!) 147/94   Pulse 65   Temp 98.4 °F (36.9 °C) (Oral)   Resp 16   Ht 6' 8\" (2.032 m)   Wt 219 lb 5.7 oz (99.5 kg)   SpO2 97%   BMI 24.10 kg/m²     Vitals reviewed. Constitutional:       Appearance: Normal appearance. HENT:      Head: Normocephalic and atraumatic. Nose: Nose normal.   Eyes:      Extraocular Movements: Extraocular movements intact. Conjunctiva/sclera: Conjunctivae normal.      Pupils: Pupils are equal, round, and reactive to light. Cardiovascular:      Rate and Rhythm: Normal rate and regular rhythm. Pulses: Normal pulses. Heart sounds: Normal heart sounds. Pulmonary:      Effort: Pulmonary effort is normal.      Breath sounds: Normal breath sounds. Abdominal:      Palpations: Abdomen is soft. Musculoskeletal:         General: Normal range of motion. Cervical back: Normal range of motion and neck supple. Neurological:      General: No focal deficit present. Mental Status: He is alert and oriented to person, place, and time.      Consults: Urologist, ID    Significant Diagnostic Studies:   CT ABDOMEN PELVIS W WO CONTRAST Additional Contrast? Oral   Final Result Increased dense right lower lobe consolidation concerning for pneumonia. No acute abdominopelvic abnormality. INCIDENTAL FINDINGS: None. CT ABDOMEN PELVIS W IV CONTRAST Additional Contrast? Radiologist Recommendation   Final Result      1. Right lower lobe pneumonia. 2. Air-fluid levels throughout normal caliber colon. No evidence of mechanical obstruction. Appearance may relate to enteritis. XR CHEST PORTABLE   Final Result      1. Negative portable chest.             Disposition: home    Discharged Condition: stable    Follow Up: Primary Care Physician in 1 week    DISCHARGE MEDICATION:     Medication List        START taking these medications      levoFLOXacin 750 MG tablet  Commonly known as: LEVAQUIN  Take 1 tablet by mouth daily for 10 days     Probiotic Acidophilus Tabs  Take 1 tablet by mouth 2 times daily for 10 days            CONTINUE taking these medications      eucerin cream  Apply topically as needed. Glucosamine 500 MG Caps     lisinopril 10 MG tablet  Commonly known as: PRINIVIL;ZESTRIL  Take 1 tablet by mouth daily     therapeutic multivitamin-minerals tablet            STOP taking these medications      atorvastatin 40 MG tablet  Commonly known as: LIPITOR     terbinafine 250 MG tablet  Commonly known as: LAMISIL               Where to Get Your Medications        These medications were sent to [place], [facility] [address] [place] [address] [address] Raad Mcintyre [phone] - F [phone] [address] [facility] [address]      Phone: [phone]   levoFLOXacin 750 MG tablet  Probiotic Acidophilus Tabs       Activity: up as tolerated  Diet: regular  Wound Care: as needed    Time Spent on discharge is more than 45 minutes    Signed:  Radha Inman MD, PGY-1  8/26/2022  11:27 AM

## 2022-08-26 NOTE — CARE COORDINATION
Case Management Assessment            Discharge Note                    Date / Time of Note: 8/26/2022 12:15 PM                  Discharge Note Completed by: KASSANDRA Florence    Patient Name: Ken Babb   YOB: 1970  Diagnosis: Acute viral syndrome [B34.9]  Sepsis [facility]) [A41.9]   Date / Time: 8/22/2022 12:37 PM    Current PCP: AMENA Soliman CNP  Clinic patient: No    Hospitalization in the last 30 days: No    Advance Directives:  Code Status: Full Code  [place] DNR form completed and on chart: No    Financial:  Payor: Sobia Sands / Plan: Jacob Found PPO / Product Type: *No Product type* /      Pharmacy:    [facility] [address] [place] [address]  Phone: [phone] Fax: [facility] [address], [facility] [address] [place] [address] [phone] Arvid Hockey [phone]  [place]  [phone]  Phone: [phone] Fax: [phone]      Assistance purchasing medications?: Potential Assistance Purchasing Medications: No  Assistance provided by Case Management: None at this time    Does patient want to participate in local refill/ meds to beds program?: Yes    Meds To Beds General Rules:  1. Can ONLY be done Monday- Friday between 8:30am-5pm  2. Prescription(s) must be in pharmacy by 3pm to be filled same day  3. Copy of patient's insurance/ prescription drug card and patient face sheet must be sent along with the prescription(s)  4. Cost of Rx cannot be added to hospital bill. If financial assistance is needed, please contact unit  or ;  or  CANNOT provide pharmacy voucher for patients co-pays  5.  Patients can then  the prescription on their way out of the hospital at discharge, or pharmacy can deliver to the bedside if staff is available. (payment due at time of pick-up or delivery - cash, check, or card accepted)     Able to afford home medications/ co-pay costs: Yes    ADLS:  Current PT AM-PAC Score:   /24  Current OT AM-PAC Score:   /24      DISCHARGE Disposition: Home- No Services Needed    LOC at discharge: Not Applicable  GEO Completed: Not Indicated    Notification completed in HENS/PAS?:  Not Applicable    IMM Completed:   Not Indicated    Transportation:  Transportation PLAN for discharge: family   Mode of Transport: Private Car  Reason for medical transport: Not Applicable  Name of Transport Company: Not Applicable  Time of Transport: n/a    Transport form completed: Not Indicated    Home Care:  [address] ordered at discharge: Not [address]: Not Applicable  Orders faxed: No    Durable Medical Equipment:  DME Provider: none  Equipment obtained during hospitalization: none    Home Oxygen and Respiratory Equipment:  Oxygen needed at discharge?: Not [address]: Not Applicable  Portable tank available for discharge?: Not Indicated    Dialysis:  Dialysis patient: No    Dialysis Center:  Not Applicable    Referrals made at [facility] for outpatient continued care:  Not Applicable    Additional CM Notes: Patient is from home with his wife. Patient will discharge home and his wife will provide transportation home. Patient reported he could afford his medications without assistance and reported no other CM needs at discharge. The Plan for Transition of Care is related to the following treatment goals of Acute viral syndrome [B34.9]  Sepsis ([facility] [address] [A41.9]    The Patient and/or patient representative Charley Arora and his family were provided with a choice of provider and agrees with the discharge plan Yes    Freedom of choice list was provided with basic dialogue that supports the patient's individualized plan of care/goals and shares the quality data associated with the providers.  Yes    Care Transitions patient: No    KASSANDRA Ty  [facility]  Case Management Department  Ph: [phone]  Fax: [phone]

## 2022-08-26 NOTE — PROGRESS NOTES
ID Follow-up NOTE  RESIDENT NOTE - reviewed / edited, attending note at bottom    CC:   Fever, Generalized Weakness  Antibiotics: Unasyn Zithromax    Admit Date: 8/22/2022  Hospital Day: 5    Subjective:     Venkat Sims is a 45 yo M w/ PMHx of HTN, generally healthy presented with few days of weakness, loose and watery stools, nausea with emesis, mildly productive cough. Arrived febrile, currently afebrile      Objective:     Patient Vitals for the past 8 hrs:   BP Temp Temp src Pulse Resp SpO2   08/26/22 0815 (!) 147/94 98.4 °F (36.9 °C) Oral 65 16 97 %   08/26/22 0452 -- 98.1 °F (36.7 °C) Oral -- -- --   08/26/22 0308 (!) 156/79 (!) 102.1 °F (38.9 °C) Oral 73 18 94 %     I/O last 3 completed shifts: In: 3000.8 [P.O.:540; I.V.:1903; IV Piggyback:557.8]  Out: 2350 [Urine:2350]  I/O this shift:  In: -   Out: 600 [Urine:600]    EXAM:  GENERAL: No apparent distress.     HEENT: Membranes moist, no oral lesion  NECK:  Supple, no lymphadenopathy  LUNGS: Clear b/l, no rales, no dullness  CARDIAC: RRR, no murmur appreciated  ABD:  + BS, soft / NT  EXT:  No rash, no edema, no lesions  NEURO: No focal neurologic findings  PSYCH: Orientation, sensorium, mood normal  LINES:  Peripheral iv       Data Review:  Lab Results   Component Value Date    WBC 6.3 08/26/2022    HGB 12.9 (L) 08/26/2022    HCT 38.2 (L) 08/26/2022    MCV 84.9 08/26/2022     08/26/2022     Lab Results   Component Value Date    CREATININE 1.0 08/26/2022    BUN 10 08/26/2022     08/26/2022    K 3.9 08/26/2022    CL 99 08/26/2022    CO2 25 08/26/2022       Hepatic Function Panel:   Lab Results   Component Value Date/Time    ALKPHOS 103 08/26/2022 04:54 AM     08/26/2022 04:54 AM     08/26/2022 04:54 AM    PROT 5.4 08/26/2022 04:54 AM    PROT 7.4 12/05/2011 10:34 AM    BILITOT 0.4 08/26/2022 04:54 AM    BILIDIR <0.2 08/24/2022 05:34 AM    IBILI see below 08/24/2022 05:34 AM    LABALBU 2.9 08/26/2022 04:54 AM       MICRO:  RSV: no result  8/22/22 BC x 2 NGTD  8/22/22 Rapid influenza A/B antigen: negative   8/23/22 COVID-19 PCR negative  8/24/22 Resp Panel w/ COVID: negative  8/24/22 Legionella antigen, urine: Presumptive Neg  8/24/22 Strep Pneumoniae antigen: Presumptive Neg  8/24/22 GI Bacterial Pathogens by PCR/Stool: negative    IMAGING:  CT ABDOMEN PELVIS W WO CONTRAST 8/24/22  Increased dense right lower lobe consolidation concerning for pneumonia. No acute abdominopelvic abnormality. CT ABDOMEN PELVIS W IV CONTRAST 8/22/22  1. Right lower lobe pneumonia. 2. Air-fluid levels throughout normal caliber colon. No evidence of mechanical obstruction. Appearance may relate to enteritis. Scheduled Meds:   azithromycin  500 mg IntraVENous Q24H    ampicillin-sulbactam  3,000 mg IntraVENous Q6H    sodium chloride flush  5-40 mL IntraVENous 2 times per day    enoxaparin  40 mg SubCUTAneous Daily    famotidine  20 mg Oral BID       Continuous Infusions:   lactated ringers Stopped (08/25/22 0457)       PRN Meds:  acetaminophen **OR** acetaminophen, sodium chloride flush, ondansetron **OR** ondansetron, polyethylene glycol, aluminum & magnesium hydroxide-simethicone      Assessment:     Davina De La Cruz is a 45 yo M with a febrile illness and R lower lobe consolidation seen on CT who appears to be improving. He endorses feeling much better than his arrival, but not 100% better yet. Plan:     Continue with Unasyn and Azithromycin  Dr. John Schwarz as below    Discussed with MD Jayro Posada Dr, MD     Addendum to Resident Progress note:  Pt seen, examined and evaluated. I have reviewed the current history, physical findings, labs and assessment and plan and agree with note as documented by resident (Dr. Jem Pacheco). 45 yo man with hx HTN  Presents with few days weakness, loose stool, nausea & vomiting  Stool, watery, not bloody. Not assoc abd pain, cramps. Occasional cough, mostly non-productive.   No new exposure - no

## 2022-08-26 NOTE — PLAN OF CARE
Problem: Safety - Adult  Goal: Free from fall injury  Outcome: Completed     Problem: Metabolic/Fluid and Electrolytes - Adult  Goal: Electrolytes maintained within normal limits  Outcome: Completed     Problem: Metabolic/Fluid and Electrolytes - Adult  Goal: Hemodynamic stability and optimal renal function maintained  Outcome: Completed     Problem: Metabolic/Fluid and Electrolytes - Adult  Goal: Glucose maintained within prescribed range  Outcome: Completed     Problem: Discharge Planning  Goal: Discharge to home or other facility with appropriate resources  Outcome: Completed

## 2022-08-26 NOTE — PROGRESS NOTES
Pt resting with no complaints at this time. RR easy and unlabored. Intermittent chills and diaphoresis throughout shift- gown changed multiple times but declines complete bed change. Pt given PRN tylenol for oral temp of 102. 1. Repeat temp 98.1. IVF infusing. Bed locked and in lowest position. Pt updated on care plan/status. Will continue to monitor.

## 2022-09-01 ENCOUNTER — OFFICE VISIT (OUTPATIENT)
Dept: FAMILY MEDICINE CLINIC | Age: 52
End: 2022-09-01
Payer: COMMERCIAL

## 2022-09-01 VITALS
BODY MASS INDEX: 24.65 KG/M2 | WEIGHT: 213 LBS | SYSTOLIC BLOOD PRESSURE: 124 MMHG | DIASTOLIC BLOOD PRESSURE: 82 MMHG | TEMPERATURE: 97.8 F | HEART RATE: 83 BPM | OXYGEN SATURATION: 96 % | HEIGHT: 78 IN

## 2022-09-01 DIAGNOSIS — B34.9 ACUTE VIRAL SYNDROME: Primary | ICD-10-CM

## 2022-09-01 PROCEDURE — 99214 OFFICE O/P EST MOD 30 MIN: CPT | Performed by: NURSE PRACTITIONER

## 2022-09-01 ASSESSMENT — ENCOUNTER SYMPTOMS
SHORTNESS OF BREATH: 0
CONSTIPATION: 0
ABDOMINAL PAIN: 0
CHEST TIGHTNESS: 0
RHINORRHEA: 0
VOMITING: 0
COLOR CHANGE: 0
EYE ITCHING: 0
EYE REDNESS: 0
BLOOD IN STOOL: 0
WHEEZING: 0
BACK PAIN: 0
SORE THROAT: 0
COUGH: 0
NAUSEA: 0
DIARRHEA: 0
SINUS PRESSURE: 0

## 2022-09-01 NOTE — PROGRESS NOTES
Ken Babb (:  1970) is a 46 y.o. male,Established patient, here for evaluation of the following chief complaint(s):  Follow-up (hospital)      ASSESSMENT/PLAN:  1. Acute viral syndrome  Assessment & Plan:  Patient recovering well from acute viral illness. Advised he can begin exercising and go back to work as long as he feels comfortable. Continue medications as prescribed. No follow-ups on file. SUBJECTIVE/OBJECTIVE:  Patient is here for follow up after hospital visit for acute viral infection. He states he is overall doing much better. Still mildly fatigued, but denies chest pain, sob, fevers, hematuria, n/v.    Planning to follow up with urology. Current Outpatient Medications   Medication Sig Dispense Refill    Probiotic Acidophilus (FLORANEX) TABS Take 1 tablet by mouth 2 times daily for 10 days 20 tablet 0    levoFLOXacin (LEVAQUIN) 750 MG tablet Take 1 tablet by mouth daily for 10 days (Patient not taking: Reported on 2022) 10 tablet 0    Multiple Vitamins-Minerals (THERAPEUTIC MULTIVITAMIN-MINERALS) tablet Take 1 tablet by mouth daily (Patient not taking: Reported on 2022)      Glucosamine 500 MG CAPS Take by mouth (Patient not taking: Reported on 2022)      Skin Protectants, Misc. (EUCERIN) cream Apply topically as needed. (Patient not taking: Reported on 2022) 454 g 0    lisinopril (PRINIVIL;ZESTRIL) 10 MG tablet Take 1 tablet by mouth daily (Patient not taking: Reported on 2022) 90 tablet 1     No current facility-administered medications for this visit. Review of Systems   Constitutional:  Negative for chills, fatigue and fever. HENT:  Negative for congestion, ear pain, postnasal drip, rhinorrhea, sinus pressure, sneezing and sore throat. Eyes:  Negative for redness and itching. Respiratory:  Negative for cough, chest tightness, shortness of breath and wheezing. Cardiovascular:  Negative for chest pain and palpitations.    Gastrointestinal: Negative for abdominal pain, blood in stool, constipation, diarrhea, nausea and vomiting. Endocrine: Negative for cold intolerance and heat intolerance. Genitourinary:  Negative for difficulty urinating, dysuria, flank pain, frequency, hematuria and urgency. Musculoskeletal:  Negative for arthralgias, back pain, joint swelling and myalgias. Skin:  Negative for color change, pallor, rash and wound. Allergic/Immunologic: Negative for environmental allergies and food allergies. Neurological:  Negative for dizziness, seizures, syncope, weakness, light-headedness, numbness and headaches. Hematological:  Negative for adenopathy. Does not bruise/bleed easily. Psychiatric/Behavioral:  Negative for confusion, sleep disturbance and suicidal ideas. The patient is not nervous/anxious and is not hyperactive. Vitals:    09/01/22 1416   BP: 124/82   Site: Right Upper Arm   Position: Sitting   Cuff Size: Large Adult   Pulse: 83   Temp: 97.8 °F (36.6 °C)   SpO2: 96%   Weight: 213 lb (96.6 kg)   Height: 6' 8\" (2.032 m)       Physical Exam  Constitutional:       Appearance: Normal appearance. He is well-developed. HENT:      Head: Normocephalic and atraumatic. Right Ear: Hearing normal.      Left Ear: Hearing normal.      Nose: No mucosal edema. Right Sinus: No maxillary sinus tenderness or frontal sinus tenderness. Left Sinus: No maxillary sinus tenderness or frontal sinus tenderness. Mouth/Throat: Tonsils: No tonsillar abscesses. Eyes:      Extraocular Movements: Extraocular movements intact. Pupils: Pupils are equal, round, and reactive to light. Cardiovascular:      Rate and Rhythm: Normal rate and regular rhythm. Pulses: Normal pulses. Heart sounds: Normal heart sounds. Pulmonary:      Effort: Pulmonary effort is normal.      Breath sounds: Normal breath sounds.    Lymphadenopathy:      Head:      Right side of head: No submental, submandibular, tonsillar, preauricular, posterior auricular or occipital adenopathy. Left side of head: No submental, submandibular, tonsillar, preauricular, posterior auricular or occipital adenopathy. Skin:     General: Skin is warm and dry. Neurological:      Mental Status: He is alert. Psychiatric:         Mood and Affect: Mood normal.         Behavior: Behavior normal.               An electronic signature was used to authenticate this note.     --AMENA Ross - CNP

## 2022-09-01 NOTE — ASSESSMENT & PLAN NOTE
Patient recovering well from acute viral illness. Advised he can begin exercising and go back to work as long as he feels comfortable. Continue medications as prescribed.

## 2022-09-06 ENCOUNTER — OFFICE VISIT (OUTPATIENT)
Dept: FAMILY MEDICINE CLINIC | Age: 52
End: 2022-09-06
Payer: COMMERCIAL

## 2022-09-06 VITALS
SYSTOLIC BLOOD PRESSURE: 124 MMHG | WEIGHT: 216 LBS | HEART RATE: 102 BPM | OXYGEN SATURATION: 98 % | DIASTOLIC BLOOD PRESSURE: 82 MMHG | BODY MASS INDEX: 24.99 KG/M2 | TEMPERATURE: 97.3 F | HEIGHT: 78 IN

## 2022-09-06 DIAGNOSIS — B34.9 ACUTE VIRAL SYNDROME: ICD-10-CM

## 2022-09-06 DIAGNOSIS — I10 ESSENTIAL HYPERTENSION: Chronic | ICD-10-CM

## 2022-09-06 PROCEDURE — 99214 OFFICE O/P EST MOD 30 MIN: CPT | Performed by: NURSE PRACTITIONER

## 2022-09-06 ASSESSMENT — ENCOUNTER SYMPTOMS
RHINORRHEA: 0
NAUSEA: 0
COUGH: 0
SORE THROAT: 0
SINUS PRESSURE: 0
EYE REDNESS: 0
CHEST TIGHTNESS: 0
WHEEZING: 0
VOMITING: 0
BLOOD IN STOOL: 0
COLOR CHANGE: 0
DIARRHEA: 0
SHORTNESS OF BREATH: 0
EYE ITCHING: 0
ABDOMINAL PAIN: 0
BACK PAIN: 0
CONSTIPATION: 0

## 2022-09-06 NOTE — PROGRESS NOTES
Chaparrita Pickett (:  1970) is a 46 y.o. male,{New vs Established:[id number]::\"Established patient\"}, here for evaluation of the following chief complaint(s):  No chief complaint on file. ASSESSMENT/PLAN:  {There are no diagnoses linked to this encounter. (Refresh or delete this SmartLink)}    No follow-ups on file. SUBJECTIVE/OBJECTIVE:      Current Outpatient Medications   Medication Sig Dispense Refill    Multiple Vitamins-Minerals (THERAPEUTIC MULTIVITAMIN-MINERALS) tablet Take 1 tablet by mouth daily (Patient not taking: No sig reported)      Glucosamine 500 MG CAPS Take by mouth (Patient not taking: No sig reported)      Skin Protectants, Misc. (EUCERIN) cream Apply topically as needed. (Patient not taking: No sig reported) 454 g 0     No current facility-administered medications for this visit. Review of Systems    Vitals:    22 1203   BP: 124/82   Site: Left Upper Arm   Position: Sitting   Cuff Size: Large Adult   Pulse: (!) 102   Temp: 97.3 °F (36.3 °C)   SpO2: 98%   Weight: 216 lb (98 kg)   Height: 6' 8\" (2.032 m)       Physical Exam      {Time Documentation Optional:290959997}      An electronic signature was used to authenticate this note.     --AMENA Chery - CNP

## 2022-09-06 NOTE — PROGRESS NOTES
Santosh Bright (:  1970) is a 46 y.o. male,Established patient, here for evaluation of the following chief complaint(s):  No chief complaint on file. ASSESSMENT/PLAN:  1. Acute viral syndrome  Assessment & Plan:   Respiratory standpoint completely stable lungs clear bilaterally. Abdominal exam without any discomfort denies any urinary complaints. Suspect that some discomfort could be from mild inflammation of the rib cage with some costochondritis but otherwise no concerns. Call if symptoms not improve as expected or worsen. 2. Essential hypertension  Assessment & Plan:   His blood pressure significantly improved we will go ahead and stop lisinopril and continue to monitor. No follow-ups on file. SUBJECTIVE/OBJECTIVE:  Patient the office to follow-up on his blood pressure and some minor chest pressure on the right side with breathing. He was recently discharged from the hospital so just wanted to be evaluated. He denies any chest pain and states that his blood pressures have been very well controlled. He is open to get back in the gym on Thursday to start lifting again. Current Outpatient Medications   Medication Sig Dispense Refill    Multiple Vitamins-Minerals (THERAPEUTIC MULTIVITAMIN-MINERALS) tablet Take 1 tablet by mouth daily (Patient not taking: No sig reported)      Glucosamine 500 MG CAPS Take by mouth (Patient not taking: No sig reported)      Skin Protectants, Misc. (EUCERIN) cream Apply topically as needed. (Patient not taking: No sig reported) 454 g 0     No current facility-administered medications for this visit. Review of Systems   Constitutional:  Negative for chills, fatigue and fever. HENT:  Negative for congestion, ear pain, postnasal drip, rhinorrhea, sinus pressure, sneezing and sore throat. Eyes:  Negative for redness and itching. Respiratory:  Negative for cough, chest tightness, shortness of breath and wheezing.     Cardiovascular:  Negative for chest No submental, submandibular, tonsillar, preauricular, posterior auricular or occipital adenopathy. Left side of head: No submental, submandibular, tonsillar, preauricular, posterior auricular or occipital adenopathy. Skin:     General: Skin is warm and dry. Neurological:      Mental Status: He is alert. Psychiatric:         Mood and Affect: Mood normal.         Behavior: Behavior normal.               An electronic signature was used to authenticate this note.     --AMENA Coates - CNP

## 2022-09-06 NOTE — ASSESSMENT & PLAN NOTE
His blood pressure significantly improved we will go ahead and stop lisinopril and continue to monitor.

## 2022-09-06 NOTE — ASSESSMENT & PLAN NOTE
Respiratory standpoint completely stable lungs clear bilaterally. Abdominal exam without any discomfort denies any urinary complaints. Suspect that some discomfort could be from mild inflammation of the rib cage with some costochondritis but otherwise no concerns. Call if symptoms not improve as expected or worsen.

## 2022-09-20 ENCOUNTER — TELEPHONE (OUTPATIENT)
Dept: SURGERY | Age: 52
End: 2022-09-20

## 2022-09-20 NOTE — TELEPHONE ENCOUNTER
Pt called to reschedule colonoscopy. He has to work. He was rescheduled to 11/16/22 at 10:00 AM with arrival time of 8:30 am. New letter was sent to scheduling and [place] calendar was updated. This is third time pt has had to reschedule. Next time pt will need referred to [facility].

## 2022-09-20 NOTE — TELEPHONE ENCOUNTER
I have placed a reminder call to patient for upcoming procedure. Did you speak directly to patient or leave a voicemail? Voicemail    Prep? NPO 12AM  Colonoscopy prep      Must have a  that is over the age of 25. Must be a friend or family member that can be responsible for signing them out after surgery.     Arrive at the main entrance [facility] at 7:00AM

## 2022-10-19 RX ORDER — LISINOPRIL 10 MG/1
10 TABLET ORAL DAILY
Qty: 90 TABLET | Refills: 1 | Status: SHIPPED | OUTPATIENT
Start: 2022-10-19

## 2022-11-15 ENCOUNTER — TELEPHONE (OUTPATIENT)
Dept: SURGERY | Age: 52
End: 2022-11-15

## 2022-11-15 NOTE — TELEPHONE ENCOUNTER
I called patient to confirm his colonoscopy tomorrow 11/16 with . Patient requested the prep be sent again. I asked if he had anything to eat today and he said yes. Unfortunately he cannot have a colonoscopy because of this. This is the third time patient has rescheduled. Per office policy, we can not reschedule him. I gave him the # to [facility]. Cancellation routed to surgery and removed from [place] calendar.

## 2023-06-06 ENCOUNTER — TELEPHONE (OUTPATIENT)
Dept: FAMILY MEDICINE CLINIC | Age: 53
End: 2023-06-06

## 2023-06-06 DIAGNOSIS — Z00.00 ANNUAL PHYSICAL EXAM: ICD-10-CM

## 2023-06-06 DIAGNOSIS — I25.10 ARTERIOSCLEROTIC CORONARY ARTERY DISEASE: ICD-10-CM

## 2023-06-06 DIAGNOSIS — E78.00 PURE HYPERCHOLESTEROLEMIA: ICD-10-CM

## 2023-06-06 DIAGNOSIS — E78.2 MIXED HYPERLIPIDEMIA: Primary | Chronic | ICD-10-CM

## 2023-06-06 DIAGNOSIS — R79.89 ELEVATED LIVER FUNCTION TESTS: ICD-10-CM

## 2023-06-06 NOTE — TELEPHONE ENCOUNTER
Patient has scheduled his annual physical for 6/21/23. He would like his blood work to be ordered so he can have the results in by the time of his appointment. Please place orders, he will go have labs done next week. Thanks.

## 2023-06-19 DIAGNOSIS — E78.2 MIXED HYPERLIPIDEMIA: Chronic | ICD-10-CM

## 2023-06-19 DIAGNOSIS — R79.89 ELEVATED LIVER FUNCTION TESTS: ICD-10-CM

## 2023-06-19 DIAGNOSIS — E78.00 PURE HYPERCHOLESTEROLEMIA: ICD-10-CM

## 2023-06-19 DIAGNOSIS — Z00.00 ANNUAL PHYSICAL EXAM: ICD-10-CM

## 2023-06-19 LAB
ALBUMIN SERPL-MCNC: 4 G/DL (ref 3.4–5)
ALP SERPL-CCNC: 63 U/L (ref 40–129)
ALT SERPL-CCNC: 36 U/L (ref 10–40)
AST SERPL-CCNC: 31 U/L (ref 15–37)
BILIRUB DIRECT SERPL-MCNC: <0.2 MG/DL (ref 0–0.3)
BILIRUB INDIRECT SERPL-MCNC: NORMAL MG/DL (ref 0–1)
BILIRUB SERPL-MCNC: 0.3 MG/DL (ref 0–1)
PROT SERPL-MCNC: 6.8 G/DL (ref 6.4–8.2)

## 2023-06-19 ASSESSMENT — PATIENT HEALTH QUESTIONNAIRE - PHQ9
1. LITTLE INTEREST OR PLEASURE IN DOING THINGS: 0
SUM OF ALL RESPONSES TO PHQ QUESTIONS 1-9: 0
SUM OF ALL RESPONSES TO PHQ9 QUESTIONS 1 & 2: 0
SUM OF ALL RESPONSES TO PHQ QUESTIONS 1-9: 0
SUM OF ALL RESPONSES TO PHQ9 QUESTIONS 1 & 2: 0
SUM OF ALL RESPONSES TO PHQ QUESTIONS 1-9: 0
SUM OF ALL RESPONSES TO PHQ QUESTIONS 1-9: 0
2. FEELING DOWN, DEPRESSED OR HOPELESS: NOT AT ALL
2. FEELING DOWN, DEPRESSED OR HOPELESS: 0
1. LITTLE INTEREST OR PLEASURE IN DOING THINGS: NOT AT ALL

## 2023-06-20 LAB
EST. AVERAGE GLUCOSE BLD GHB EST-MCNC: 105.4 MG/DL
HBA1C MFR BLD: 5.3 %

## 2023-06-21 ENCOUNTER — OFFICE VISIT (OUTPATIENT)
Dept: FAMILY MEDICINE CLINIC | Age: 53
End: 2023-06-21
Payer: COMMERCIAL

## 2023-06-21 VITALS
HEART RATE: 70 BPM | OXYGEN SATURATION: 99 % | WEIGHT: 243.4 LBS | RESPIRATION RATE: 18 BRPM | DIASTOLIC BLOOD PRESSURE: 86 MMHG | TEMPERATURE: 97.1 F | SYSTOLIC BLOOD PRESSURE: 126 MMHG | BODY MASS INDEX: 26.74 KG/M2

## 2023-06-21 DIAGNOSIS — R74.01 TRANSAMINITIS: ICD-10-CM

## 2023-06-21 DIAGNOSIS — B35.1 ONYCHOMYCOSIS: ICD-10-CM

## 2023-06-21 DIAGNOSIS — Z00.00 ENCOUNTER FOR WELL ADULT EXAM WITHOUT ABNORMAL FINDINGS: Primary | ICD-10-CM

## 2023-06-21 PROBLEM — R50.9 FEVER: Status: RESOLVED | Noted: 2022-08-24 | Resolved: 2023-06-21

## 2023-06-21 PROBLEM — R68.89 HYPOKINESIS: Status: RESOLVED | Noted: 2017-01-16 | Resolved: 2023-06-21

## 2023-06-21 PROBLEM — B34.9 ACUTE VIRAL SYNDROME: Status: RESOLVED | Noted: 2022-08-22 | Resolved: 2023-06-21

## 2023-06-21 PROBLEM — A41.9 SEPSIS (HCC): Status: RESOLVED | Noted: 2022-08-23 | Resolved: 2023-06-21

## 2023-06-21 PROBLEM — I51.89 DIASTOLIC DYSFUNCTION: Status: RESOLVED | Noted: 2017-01-16 | Resolved: 2023-06-21

## 2023-06-21 PROBLEM — E87.1 HYPONATREMIA: Status: RESOLVED | Noted: 2022-08-22 | Resolved: 2023-06-21

## 2023-06-21 PROBLEM — I25.10 ARTERIOSCLEROTIC CORONARY ARTERY DISEASE: Status: RESOLVED | Noted: 2017-01-25 | Resolved: 2023-06-21

## 2023-06-21 PROBLEM — R53.1 GENERALIZED WEAKNESS: Status: RESOLVED | Noted: 2022-08-22 | Resolved: 2023-06-21

## 2023-06-21 PROBLEM — R79.89 ELEVATED LIVER FUNCTION TESTS: Status: RESOLVED | Noted: 2022-08-24 | Resolved: 2023-06-21

## 2023-06-21 PROBLEM — L40.9 PSORIASIS: Status: RESOLVED | Noted: 2022-07-05 | Resolved: 2023-06-21

## 2023-06-21 PROBLEM — R07.9 CHEST PAIN: Status: RESOLVED | Noted: 2017-01-02 | Resolved: 2023-06-21

## 2023-06-21 PROCEDURE — 99396 PREV VISIT EST AGE 40-64: CPT | Performed by: NURSE PRACTITIONER

## 2023-06-21 RX ORDER — LISINOPRIL 10 MG/1
10 TABLET ORAL DAILY
Qty: 90 TABLET | Refills: 1 | Status: CANCELLED | OUTPATIENT
Start: 2023-06-21

## 2023-06-21 SDOH — ECONOMIC STABILITY: FOOD INSECURITY: WITHIN THE PAST 12 MONTHS, THE FOOD YOU BOUGHT JUST DIDN'T LAST AND YOU DIDN'T HAVE MONEY TO GET MORE.: NEVER TRUE

## 2023-06-21 SDOH — ECONOMIC STABILITY: FOOD INSECURITY: WITHIN THE PAST 12 MONTHS, YOU WORRIED THAT YOUR FOOD WOULD RUN OUT BEFORE YOU GOT MONEY TO BUY MORE.: NEVER TRUE

## 2023-06-21 SDOH — ECONOMIC STABILITY: HOUSING INSECURITY
IN THE LAST 12 MONTHS, WAS THERE A TIME WHEN YOU DID NOT HAVE A STEADY PLACE TO SLEEP OR SLEPT IN A SHELTER (INCLUDING NOW)?: NO

## 2023-06-21 SDOH — ECONOMIC STABILITY: INCOME INSECURITY: HOW HARD IS IT FOR YOU TO PAY FOR THE VERY BASICS LIKE FOOD, HOUSING, MEDICAL CARE, AND HEATING?: NOT HARD AT ALL

## 2023-06-21 ASSESSMENT — ENCOUNTER SYMPTOMS
RHINORRHEA: 0
VOMITING: 0
CHEST TIGHTNESS: 0
ABDOMINAL PAIN: 0
WHEEZING: 0
CONSTIPATION: 0
EYE ITCHING: 0
COLOR CHANGE: 0
BLOOD IN STOOL: 0
SINUS PRESSURE: 0
SHORTNESS OF BREATH: 0
SORE THROAT: 0
EYE REDNESS: 0
BACK PAIN: 0
COUGH: 0
NAUSEA: 0
DIARRHEA: 0

## 2023-06-21 NOTE — PROGRESS NOTES
social    Drug use: No       Objective     Vital Signs  /86   Pulse 70   Temp 97.1 °F (36.2 °C) (Temporal)   Resp 18   Wt 243 lb 6.4 oz (110.4 kg)   SpO2 99%   BMI 26.74 kg/m²   Wt Readings from Last 3 Encounters:   06/21/23 243 lb 6.4 oz (110.4 kg)   09/06/22 216 lb (98 kg)   09/01/22 213 lb (96.6 kg)       Waist Circumference  There were no vitals filed for this visit. Physical Exam  Constitutional:       Appearance: Normal appearance. He is normal weight. HENT:      Head: Normocephalic and atraumatic. Right Ear: Tympanic membrane, ear canal and external ear normal.      Left Ear: Tympanic membrane, ear canal and external ear normal.      Nose: Nose normal.      Mouth/Throat:      Mouth: Mucous membranes are dry. Eyes:      Extraocular Movements: Extraocular movements intact. Conjunctiva/sclera: Conjunctivae normal.      Pupils: Pupils are equal, round, and reactive to light. Cardiovascular:      Rate and Rhythm: Normal rate and regular rhythm. Pulses: Normal pulses. Heart sounds: Normal heart sounds. Pulmonary:      Effort: Pulmonary effort is normal.      Breath sounds: Normal breath sounds. Abdominal:      General: Abdomen is flat. Bowel sounds are normal.      Palpations: Abdomen is soft. Tenderness: There is no abdominal tenderness. Musculoskeletal:         General: Normal range of motion. Cervical back: Normal range of motion and neck supple. Feet:      Right foot:      Toenail Condition: Fungal disease present. Left foot:      Toenail Condition: Fungal disease present. Skin:     General: Skin is warm and dry. Neurological:      General: No focal deficit present. Mental Status: He is alert and oriented to person, place, and time. Psychiatric:         Mood and Affect: Mood normal.         Behavior: Behavior normal.       Assessment   Plan   1.  Encounter for well adult exam without abnormal findings  Assessment & Plan:   Well exam

## 2023-06-21 NOTE — ASSESSMENT & PLAN NOTE
Due to elevated liver enzymes in the past, would not suggest repeat oral antifungals, will refer to podiatry for further follow up and management

## 2023-07-06 ENCOUNTER — TELEPHONE (OUTPATIENT)
Dept: FAMILY MEDICINE CLINIC | Age: 53
End: 2023-07-06

## 2023-07-06 RX ORDER — MECLIZINE HCL 12.5 MG/1
12.5 TABLET ORAL 3 TIMES DAILY PRN
Qty: 20 TABLET | Refills: 0 | Status: SHIPPED | OUTPATIENT
Start: 2023-07-06 | End: 2023-07-16

## 2023-08-21 ENCOUNTER — TELEPHONE (OUTPATIENT)
Dept: FAMILY MEDICINE CLINIC | Age: 53
End: 2023-08-21

## 2023-08-21 DIAGNOSIS — Z12.5 SCREENING PSA (PROSTATE SPECIFIC ANTIGEN): Primary | ICD-10-CM

## 2023-08-21 NOTE — TELEPHONE ENCOUNTER
Pt is requesting a refill on a medication     RAPAFLO 4 MG CAPS capsule [[id number]]  DISCONTINUED    Order Details  Dose: 4 mg Route: Oral Frequency: EVERY EVENING   Dispense Quantity: 30 capsule Refills: 2          Sig: Take 1 capsule by mouth every evening         Start Date: 10/22/20 End Date: 08/22/22   Discontinued by: Nick Simmons RN on 8/22/2022 13:03   Reason: LIST CLEANUP         Written Date: 10/22/20 Expiration Date: 10/22/21   Original Order:  RAPAFLO 4 MG CAPS capsule [[id number]]   Providers    Authorizing Provider: AMENA Langley CNP NPI: [id number]   Ordering User:  AMENA Langley CNP    [facility] #[id number] - [address], [place] - [address] [place] [phone]   [address] , [facility] [address]   Phone:  [phone]  Fax:  [phone]

## 2023-08-22 ENCOUNTER — OFFICE VISIT (OUTPATIENT)
Dept: FAMILY MEDICINE CLINIC | Age: 53
End: 2023-08-22
Payer: COMMERCIAL

## 2023-08-22 ENCOUNTER — HOSPITAL ENCOUNTER (OUTPATIENT)
Dept: GENERAL RADIOLOGY | Age: 53
Discharge: HOME | End: 2023-08-22
Payer: COMMERCIAL

## 2023-08-22 VITALS
DIASTOLIC BLOOD PRESSURE: 86 MMHG | HEART RATE: 80 BPM | BODY MASS INDEX: 28.35 KG/M2 | OXYGEN SATURATION: 95 % | HEIGHT: 78 IN | SYSTOLIC BLOOD PRESSURE: 120 MMHG | TEMPERATURE: 98.6 F | WEIGHT: 245 LBS

## 2023-08-22 DIAGNOSIS — R39.198 URINARY DYSFUNCTION: ICD-10-CM

## 2023-08-22 DIAGNOSIS — R39.198 URINARY DYSFUNCTION: Primary | ICD-10-CM

## 2023-08-22 LAB
BILIRUBIN, POC: NORMAL
BLOOD URINE, POC: NORMAL
CLARITY, POC: NORMAL
COLOR, POC: NORMAL
GLUCOSE URINE, POC: NORMAL
KETONES, POC: NORMAL
LEUKOCYTE EST, POC: NORMAL
NITRITE, POC: NORMAL
PH, POC: 6
PROTEIN, POC: NORMAL
SPECIFIC GRAVITY, POC: 1.02
UROBILINOGEN, POC: 0.2

## 2023-08-22 PROCEDURE — 99214 OFFICE O/P EST MOD 30 MIN: CPT | Performed by: NURSE PRACTITIONER

## 2023-08-22 PROCEDURE — 74018 RADEX ABDOMEN 1 VIEW: CPT

## 2023-08-22 PROCEDURE — 81002 URINALYSIS NONAUTO W/O SCOPE: CPT | Performed by: NURSE PRACTITIONER

## 2023-08-22 RX ORDER — TAMSULOSIN HYDROCHLORIDE 0.4 MG/1
0.4 CAPSULE ORAL DAILY
Qty: 30 CAPSULE | Refills: 5 | Status: SHIPPED | OUTPATIENT
Start: 2023-08-22

## 2023-08-22 ASSESSMENT — ENCOUNTER SYMPTOMS
SINUS PRESSURE: 0
ABDOMINAL PAIN: 0
NAUSEA: 0
DIARRHEA: 0
CHEST TIGHTNESS: 0
COUGH: 0
RHINORRHEA: 0
SORE THROAT: 0
VOMITING: 0
EYE ITCHING: 0
CONSTIPATION: 0
SHORTNESS OF BREATH: 0
COLOR CHANGE: 0
EYE REDNESS: 0
BACK PAIN: 0
BLOOD IN STOOL: 0
WHEEZING: 0

## 2023-08-22 NOTE — PROGRESS NOTES
Presley Hernandez (:  1970) is a 46 y.o. male,Established patient, here for evaluation of the following chief complaint(s): Other (Burning in lower abdomen)      ASSESSMENT/PLAN:  1. Urinary dysfunction  Assessment & Plan:   Trace blood on UA, send for culture  Check psa  KUB  flomax to pharmacy,  Further recs to follow     Orders:  -     POCT Urinalysis no Micro  -     XR ABDOMEN (KUB) (SINGLE AP VIEW); Future  -     PSA, Prostatic Specific Antigen; Future  -     Culture, Urine      No follow-ups on file. SUBJECTIVE/OBJECTIVE:    In the office with dysuria. He  notices pain described as burning right before voiding. He is not having any lower back pain. He has history of BPH. He has not noticed any blood in his urine. He thought about possible infection, but had a renal stone back in his 25s. Current Outpatient Medications   Medication Sig Dispense Refill    tamsulosin (FLOMAX) 0.4 MG capsule Take 1 capsule by mouth daily 30 capsule 5    lisinopril (PRINIVIL;ZESTRIL) 10 MG tablet TAKE 1 TABLET BY MOUTH DAILY 90 tablet 1     No current facility-administered medications for this visit. Review of Systems   Constitutional:  Negative for chills, fatigue and fever. HENT:  Negative for congestion, ear pain, postnasal drip, rhinorrhea, sinus pressure, sneezing and sore throat. Eyes:  Negative for redness and itching. Respiratory:  Negative for cough, chest tightness, shortness of breath and wheezing. Cardiovascular:  Negative for chest pain and palpitations. Gastrointestinal:  Negative for abdominal pain, blood in stool, constipation, diarrhea, nausea and vomiting. Endocrine: Negative for cold intolerance and heat intolerance. Genitourinary:  Positive for dysuria. Negative for difficulty urinating, flank pain, frequency, hematuria and urgency. Musculoskeletal:  Negative for arthralgias, back pain, joint swelling and myalgias. Skin:  Negative for color change, pallor, rash and wound.

## 2023-08-23 LAB — PSA SERPL DL<=0.01 NG/ML-MCNC: 0.94 NG/ML (ref 0–4)

## 2023-08-24 LAB — BACTERIA UR CULT: NORMAL

## 2023-08-25 ENCOUNTER — TELEPHONE (OUTPATIENT)
Dept: FAMILY MEDICINE CLINIC | Age: 53
End: 2023-08-25

## 2023-08-25 NOTE — TELEPHONE ENCOUNTER
Pt still having urinary frequency and burning holding urine. Feels pain in lower back but it does not burn when urinating. Pt thought he was prescribed an antibiotic but I advised his results showed negative.  Please advise

## 2023-08-28 NOTE — TELEPHONE ENCOUNTER
Pt verbalizes readiness for discharge. Pt ambulating with front wheeled walker. Pt pain controlled with Norco. Pt met all current goals and outcomes. Pt verbalizes understanding of all discharge instructions, discharge medication reconciliation, and adverse signs/symptoms.    Patient demonstrates teach back education regarding new medication/prescriptions. Copy of medication education card teaching provided. Patient verbalizes anticoagulation with Aspirin to begin upon discharge per providers instructions.    Printed AVS summary provided. Pt transported home with her daughter. Belongings with patient. Prescriptions with patient.    Pt was called and notified about his urine culture. Pt didn't have no further question.

## 2023-08-28 NOTE — TELEPHONE ENCOUNTER
That is correct, his urine culture showed no infection.  Recommend following with urology for further evaluation if no improvement with flomax

## 2023-09-05 ENCOUNTER — TELEPHONE (OUTPATIENT)
Dept: FAMILY MEDICINE CLINIC | Age: 53
End: 2023-09-05

## 2023-09-05 DIAGNOSIS — R39.198 URINARY DYSFUNCTION: Primary | ICD-10-CM

## 2023-09-21 RX ORDER — ATORVASTATIN CALCIUM 40 MG/1
40 TABLET, FILM COATED ORAL DAILY
Qty: 90 TABLET | Refills: 1 | Status: SHIPPED | OUTPATIENT
Start: 2023-09-21

## 2023-09-21 RX ORDER — LISINOPRIL 10 MG/1
10 TABLET ORAL DAILY
Qty: 90 TABLET | Refills: 1 | Status: SHIPPED | OUTPATIENT
Start: 2023-09-21

## 2023-10-02 NOTE — TELEPHONE ENCOUNTER
Patient said he want his lisinopril (PRINIVIL;ZESTRIL) 10 MG tablet and atorvastatin (LIPITOR) 40 MG tablet sent to [facility] #[id number] [facility], [facility] [phone].  He said he has never used [place] for these scripts

## 2023-10-03 RX ORDER — ATORVASTATIN CALCIUM 40 MG/1
40 TABLET, FILM COATED ORAL DAILY
Qty: 90 TABLET | Refills: 1 | Status: SHIPPED | OUTPATIENT
Start: 2023-10-03

## 2023-10-03 RX ORDER — LISINOPRIL 10 MG/1
10 TABLET ORAL DAILY
Qty: 90 TABLET | Refills: 1 | Status: SHIPPED | OUTPATIENT
Start: 2023-10-03

## 2023-10-19 ENCOUNTER — PATIENT MESSAGE (OUTPATIENT)
Dept: FAMILY MEDICINE CLINIC | Age: 53
End: 2023-10-19

## 2023-10-19 DIAGNOSIS — Z12.11 COLON CANCER SCREENING: Primary | ICD-10-CM

## 2024-05-21 ENCOUNTER — TELEPHONE (OUTPATIENT)
Dept: FAMILY MEDICINE CLINIC | Age: 54
End: 2024-05-21

## 2024-05-21 NOTE — TELEPHONE ENCOUNTER
Spoke to pt and relayed that it is possible that he may have to get his routine physical a year and a day apart for insurance coverage. Advised to call insurance to ensure that he may schedule his physical at any time. Pt scheduled physical for 5/23/2024 and will return call/[employer] message if appointment must be changed. No additional questions or concerns at this time.

## 2024-05-21 NOTE — TELEPHONE ENCOUNTER
----- Message from Magan Suzannetaj Bush sent at 5/21/2024 10:41 AM EDT -----  Regarding: [facility] Appointment Request  E[facility] Appointment Request    Patient needs appointment for E[facility] Appointment Type: Annual Visit.    Reason for Appointment Request: Available appointments did not meet patient need. Patient wants to have an AWV with her provider but his preferred date is earlier than the June 20, 2024. Preferred time is morning appointment  --------------------------------------------------------------------------------------------------------------------------    Relationship to Patient: Self     Call Back Information: OK to leave message on voicemail  Preferred Call Back Number: Phone [phone]

## 2024-06-24 ENCOUNTER — OFFICE VISIT (OUTPATIENT)
Dept: FAMILY MEDICINE CLINIC | Age: 54
End: 2024-06-24
Payer: COMMERCIAL

## 2024-06-24 VITALS
HEART RATE: 73 BPM | TEMPERATURE: 98.5 F | WEIGHT: 231 LBS | DIASTOLIC BLOOD PRESSURE: 88 MMHG | SYSTOLIC BLOOD PRESSURE: 138 MMHG | HEIGHT: 78 IN | BODY MASS INDEX: 26.73 KG/M2 | OXYGEN SATURATION: 99 %

## 2024-06-24 DIAGNOSIS — R39.198 URINARY DYSFUNCTION: ICD-10-CM

## 2024-06-24 DIAGNOSIS — Z00.00 ROUTINE GENERAL MEDICAL EXAMINATION AT A HEALTH CARE FACILITY: ICD-10-CM

## 2024-06-24 DIAGNOSIS — Z00.00 ROUTINE GENERAL MEDICAL EXAMINATION AT A HEALTH CARE FACILITY: Primary | ICD-10-CM

## 2024-06-24 PROCEDURE — 99396 PREV VISIT EST AGE 40-64: CPT | Performed by: NURSE PRACTITIONER

## 2024-06-24 RX ORDER — SILODOSIN 4 MG/1
4 CAPSULE ORAL EVERY EVENING
Qty: 30 CAPSULE | Refills: 2 | Status: SHIPPED | OUTPATIENT
Start: 2024-06-24

## 2024-06-24 SDOH — ECONOMIC STABILITY: FOOD INSECURITY: WITHIN THE PAST 12 MONTHS, YOU WORRIED THAT YOUR FOOD WOULD RUN OUT BEFORE YOU GOT MONEY TO BUY MORE.: NEVER TRUE

## 2024-06-24 SDOH — ECONOMIC STABILITY: INCOME INSECURITY: HOW HARD IS IT FOR YOU TO PAY FOR THE VERY BASICS LIKE FOOD, HOUSING, MEDICAL CARE, AND HEATING?: NOT HARD AT ALL

## 2024-06-24 SDOH — ECONOMIC STABILITY: FOOD INSECURITY: WITHIN THE PAST 12 MONTHS, THE FOOD YOU BOUGHT JUST DIDN'T LAST AND YOU DIDN'T HAVE MONEY TO GET MORE.: NEVER TRUE

## 2024-06-24 ASSESSMENT — PATIENT HEALTH QUESTIONNAIRE - PHQ9
2. FEELING DOWN, DEPRESSED OR HOPELESS: NOT AT ALL
SUM OF ALL RESPONSES TO PHQ9 QUESTIONS 1 & 2: 0
SUM OF ALL RESPONSES TO PHQ QUESTIONS 1-9: 0
SUM OF ALL RESPONSES TO PHQ QUESTIONS 1-9: 0
1. LITTLE INTEREST OR PLEASURE IN DOING THINGS: NOT AT ALL
1. LITTLE INTEREST OR PLEASURE IN DOING THINGS: NOT AT ALL
2. FEELING DOWN, DEPRESSED OR HOPELESS: NOT AT ALL
SUM OF ALL RESPONSES TO PHQ9 QUESTIONS 1 & 2: 0
SUM OF ALL RESPONSES TO PHQ QUESTIONS 1-9: 0
SUM OF ALL RESPONSES TO PHQ QUESTIONS 1-9: 0

## 2024-06-24 ASSESSMENT — ENCOUNTER SYMPTOMS
RHINORRHEA: 0
COLOR CHANGE: 0
NAUSEA: 0
WHEEZING: 0
SORE THROAT: 0
EYE ITCHING: 0
BACK PAIN: 0
DIARRHEA: 0
VOMITING: 0
CONSTIPATION: 0
SHORTNESS OF BREATH: 0
BLOOD IN STOOL: 0
COUGH: 0
CHEST TIGHTNESS: 0
EYE REDNESS: 0
SINUS PRESSURE: 0
ABDOMINAL PAIN: 0

## 2024-06-24 NOTE — ASSESSMENT & PLAN NOTE
Is a chronic problem that is intermittently stable and controlled on Rapaflo.  He is due for PSA we will evaluate this and further recommendations to follow.

## 2024-06-24 NOTE — PROGRESS NOTES
Mark Feliciano (:  1970) is a 53 y.o. male,Established patient, here for evaluation of the following chief complaint(s):  Annual Exam      ASSESSMENT/PLAN:  1. Routine general medical examination at a health care facility  Assessment & Plan:   Physical exam complete during visit today.  Reviewed preventative care and health maintenance.  Orders placed as documented.    Orders:  -     CBC; Future  -     Comprehensive Metabolic Panel; Future  -     PSA, Prostatic Specific Antigen; Future  -     Lipid, Fasting; Future  -     Urinalysis with Reflex to Culture  2. Urinary dysfunction  Assessment & Plan:   Is a chronic problem that is intermittently stable and controlled on Rapaflo.  He is due for PSA we will evaluate this and further recommendations to follow.      No follow-ups on file.    SUBJECTIVE/OBJECTIVE:  Patient the office today for routine physical exam.  He is overall doing very well.  He is taking his medications as prescribed.  He states that he does have some prostate urinary hesitancy.  Using Rapaflo for symptom management.    Current Outpatient Medications   Medication Sig Dispense Refill    RAPAFLO 4 MG CAPS capsule Take 1 capsule by mouth every evening 30 capsule 2    atorvastatin (LIPITOR) 40 MG tablet Take 1 tablet by mouth daily 90 tablet 1    lisinopril (PRINIVIL;ZESTRIL) 10 MG tablet Take 1 tablet by mouth daily 90 tablet 1     No current facility-administered medications for this visit.         Review of Systems   Constitutional:  Negative for chills, fatigue and fever.   HENT:  Negative for congestion, ear pain, postnasal drip, rhinorrhea, sinus pressure, sneezing and sore throat.    Eyes:  Negative for redness and itching.   Respiratory:  Negative for cough, chest tightness, shortness of breath and wheezing.    Cardiovascular:  Negative for chest pain and palpitations.   Gastrointestinal:  Negative for abdominal pain, blood in stool, constipation, diarrhea, nausea and vomiting.

## 2024-06-25 LAB
ALBUMIN SERPL-MCNC: 4.3 G/DL (ref 3.4–5)
ALBUMIN/GLOB SERPL: 1.5 {RATIO} (ref 1.1–2.2)
ALP SERPL-CCNC: 72 U/L (ref 40–129)
ALT SERPL-CCNC: 25 U/L (ref 10–40)
ANION GAP SERPL CALCULATED.3IONS-SCNC: 11 MMOL/L (ref 3–16)
AST SERPL-CCNC: 22 U/L (ref 15–37)
BACTERIA URNS QL MICRO: NORMAL /HPF
BILIRUB SERPL-MCNC: 0.3 MG/DL (ref 0–1)
BILIRUB UR QL STRIP.AUTO: NEGATIVE
BUN SERPL-MCNC: 13 MG/DL (ref 7–20)
CALCIUM SERPL-MCNC: 9.6 MG/DL (ref 8.3–10.6)
CHLORIDE SERPL-SCNC: 102 MMOL/L (ref 99–110)
CHOLEST SERPL-MCNC: 245 MG/DL (ref 0–199)
CLARITY UR: CLEAR
CO2 SERPL-SCNC: 23 MMOL/L (ref 21–32)
COLOR UR: YELLOW
CREAT SERPL-MCNC: 0.9 MG/DL (ref 0.9–1.3)
DEPRECATED RDW RBC AUTO: 13.5 % (ref 12.4–15.4)
EPI CELLS #/AREA URNS AUTO: 1 /HPF (ref 0–5)
GFR SERPLBLD CREATININE-BSD FMLA CKD-EPI: >90 ML/MIN/{1.73_M2}
GLUCOSE SERPL-MCNC: 84 MG/DL (ref 70–99)
GLUCOSE UR STRIP.AUTO-MCNC: NEGATIVE MG/DL
HCT VFR BLD AUTO: 41.7 % (ref 40.5–52.5)
HDLC SERPL-MCNC: 59 MG/DL (ref 40–60)
HGB BLD-MCNC: 14.2 G/DL (ref 13.5–17.5)
HGB UR QL STRIP.AUTO: NEGATIVE
HYALINE CASTS #/AREA URNS AUTO: 1 /LPF (ref 0–8)
KETONES UR STRIP.AUTO-MCNC: ABNORMAL MG/DL
LDL CHOLESTEROL: 174 MG/DL
LEUKOCYTE ESTERASE UR QL STRIP.AUTO: NEGATIVE
MCH RBC QN AUTO: 29.3 PG (ref 26–34)
MCHC RBC AUTO-ENTMCNC: 34.1 G/DL (ref 31–36)
MCV RBC AUTO: 85.9 FL (ref 80–100)
NITRITE UR QL STRIP.AUTO: NEGATIVE
PH UR STRIP.AUTO: 6 [PH] (ref 5–8)
PLATELET # BLD AUTO: 235 K/UL (ref 135–450)
PMV BLD AUTO: 7.9 FL (ref 5–10.5)
POTASSIUM SERPL-SCNC: 4.4 MMOL/L (ref 3.5–5.1)
PROT SERPL-MCNC: 7.1 G/DL (ref 6.4–8.2)
PROT UR STRIP.AUTO-MCNC: 30 MG/DL
PSA SERPL DL<=0.01 NG/ML-MCNC: 0.83 NG/ML (ref 0–4)
RBC # BLD AUTO: 4.86 M/UL (ref 4.2–5.9)
RBC CLUMPS #/AREA URNS AUTO: 4 /HPF (ref 0–4)
SODIUM SERPL-SCNC: 136 MMOL/L (ref 136–145)
SP GR UR STRIP.AUTO: 1.04 (ref 1–1.03)
TRIGL SERPL-MCNC: 60 MG/DL (ref 0–150)
UA COMPLETE W REFLEX CULTURE PNL UR: ABNORMAL
UA DIPSTICK W REFLEX MICRO PNL UR: YES
URN SPEC COLLECT METH UR: ABNORMAL
UROBILINOGEN UR STRIP-ACNC: 1 E.U./DL
VLDLC SERPL CALC-MCNC: 12 MG/DL
WBC # BLD AUTO: 4.2 K/UL (ref 4–11)
WBC #/AREA URNS AUTO: 0 /HPF (ref 0–5)

## 2024-06-26 DIAGNOSIS — I25.10 ARTERIOSCLEROTIC CORONARY ARTERY DISEASE: ICD-10-CM

## 2024-06-26 DIAGNOSIS — E78.2 MIXED HYPERLIPIDEMIA: Primary | ICD-10-CM

## 2024-07-18 ENCOUNTER — TELEPHONE (OUTPATIENT)
Dept: FAMILY MEDICINE CLINIC | Age: 54
End: 2024-07-18

## 2024-07-18 RX ORDER — ATORVASTATIN CALCIUM 40 MG/1
40 TABLET, FILM COATED ORAL DAILY
Qty: 90 TABLET | Refills: 1 | Status: SHIPPED | OUTPATIENT
Start: 2024-07-18

## 2024-07-26 ENCOUNTER — TELEPHONE (OUTPATIENT)
Dept: FAMILY MEDICINE CLINIC | Age: 54
End: 2024-07-26

## 2024-07-26 NOTE — TELEPHONE ENCOUNTER
Patient requesting a refill.Thanks        lisinopril (PRINIVIL;ZESTRIL) 10 MG tablet Take 1 tablet by mouth daily Dispense: 90 tablet, Refills: 1 ordered --    10/03/2023 -- Monica Fermin, AMENA - CNP      Summary: Take 1 tablet by mouth daily, Disp-90 tablet, R-1 ZERO refills remain on this prescription. Your patient is requesting advance approval of refills for this medication to PREVENT ANY MISSED DOSES Normal  Guidelines: Dose, Route, Frequency: 10 mg, Oral, DAILYMed Note: Dx Associated: Start Date & Time: 10/03/2023Ord/Sold: 10/03/2023 (O)Ordered On: 10/03/2023ReportPharmacy: [id number] - [place], [place] - [address] - P [phone] - F [phone]

## 2024-07-29 RX ORDER — LISINOPRIL 10 MG/1
10 TABLET ORAL DAILY
Qty: 90 TABLET | Refills: 1 | Status: SHIPPED | OUTPATIENT
Start: 2024-07-29

## 2024-10-23 RX ORDER — MECLIZINE HCL 12.5 MG 12.5 MG/1
12.5 TABLET ORAL 3 TIMES DAILY PRN
Qty: 20 TABLET | Refills: 0 | Status: SHIPPED | OUTPATIENT
Start: 2024-10-23 | End: 2024-11-02

## 2024-10-23 RX ORDER — LISINOPRIL 10 MG/1
10 TABLET ORAL DAILY
Qty: 90 TABLET | Refills: 1 | Status: SHIPPED | OUTPATIENT
Start: 2024-10-23

## 2025-01-29 ENCOUNTER — OFFICE VISIT (OUTPATIENT)
Dept: FAMILY MEDICINE CLINIC | Age: 55
End: 2025-01-29

## 2025-01-29 VITALS
OXYGEN SATURATION: 99 % | WEIGHT: 257 LBS | BODY MASS INDEX: 29.73 KG/M2 | HEIGHT: 78 IN | HEART RATE: 72 BPM | TEMPERATURE: 98.5 F | DIASTOLIC BLOOD PRESSURE: 86 MMHG | SYSTOLIC BLOOD PRESSURE: 134 MMHG

## 2025-01-29 DIAGNOSIS — E78.2 MIXED HYPERLIPIDEMIA: Chronic | ICD-10-CM

## 2025-01-29 DIAGNOSIS — I10 PRIMARY HYPERTENSION: ICD-10-CM

## 2025-01-29 DIAGNOSIS — R53.83 FATIGUE, UNSPECIFIED TYPE: ICD-10-CM

## 2025-01-29 DIAGNOSIS — R00.0 TACHYCARDIA: ICD-10-CM

## 2025-01-29 DIAGNOSIS — R00.0 TACHYCARDIA: Primary | ICD-10-CM

## 2025-01-29 LAB
25(OH)D3 SERPL-MCNC: 43.4 NG/ML
ALBUMIN SERPL-MCNC: 4.6 G/DL (ref 3.4–5)
ALBUMIN/GLOB SERPL: 1.6 {RATIO} (ref 1.1–2.2)
ALP SERPL-CCNC: 78 U/L (ref 40–129)
ALT SERPL-CCNC: 66 U/L (ref 10–40)
ANION GAP SERPL CALCULATED.3IONS-SCNC: 11 MMOL/L (ref 3–16)
AST SERPL-CCNC: 49 U/L (ref 15–37)
BASOPHILS # BLD: 0 K/UL (ref 0–0.2)
BASOPHILS NFR BLD: 0.6 %
BILIRUB SERPL-MCNC: 0.3 MG/DL (ref 0–1)
BUN SERPL-MCNC: 17 MG/DL (ref 7–20)
CALCIUM SERPL-MCNC: 10.1 MG/DL (ref 8.3–10.6)
CHLORIDE SERPL-SCNC: 101 MMOL/L (ref 99–110)
CO2 SERPL-SCNC: 28 MMOL/L (ref 21–32)
CREAT SERPL-MCNC: 0.9 MG/DL (ref 0.9–1.3)
DEPRECATED RDW RBC AUTO: 13 % (ref 12.4–15.4)
EOSINOPHIL # BLD: 0.1 K/UL (ref 0–0.6)
EOSINOPHIL NFR BLD: 1.3 %
GFR SERPLBLD CREATININE-BSD FMLA CKD-EPI: >90 ML/MIN/{1.73_M2}
GLUCOSE SERPL-MCNC: 82 MG/DL (ref 70–99)
HCT VFR BLD AUTO: 44.3 % (ref 40.5–52.5)
HGB BLD-MCNC: 15.3 G/DL (ref 13.5–17.5)
LYMPHOCYTES # BLD: 2.4 K/UL (ref 1–5.1)
LYMPHOCYTES NFR BLD: 42.4 %
MCH RBC QN AUTO: 29.5 PG (ref 26–34)
MCHC RBC AUTO-ENTMCNC: 34.5 G/DL (ref 31–36)
MCV RBC AUTO: 85.3 FL (ref 80–100)
MONOCYTES # BLD: 0.3 K/UL (ref 0–1.3)
MONOCYTES NFR BLD: 5.7 %
NEUTROPHILS # BLD: 2.8 K/UL (ref 1.7–7.7)
NEUTROPHILS NFR BLD: 50 %
NT-PROBNP SERPL-MCNC: 45 PG/ML (ref 0–124)
PLATELET # BLD AUTO: 241 K/UL (ref 135–450)
PMV BLD AUTO: 8.4 FL (ref 5–10.5)
POTASSIUM SERPL-SCNC: 4.4 MMOL/L (ref 3.5–5.1)
PROT SERPL-MCNC: 7.5 G/DL (ref 6.4–8.2)
RBC # BLD AUTO: 5.2 M/UL (ref 4.2–5.9)
SODIUM SERPL-SCNC: 140 MMOL/L (ref 136–145)
TSH SERPL DL<=0.005 MIU/L-ACNC: 0.91 UIU/ML (ref 0.27–4.2)
WBC # BLD AUTO: 5.6 K/UL (ref 4–11)

## 2025-01-29 SDOH — ECONOMIC STABILITY: FOOD INSECURITY: WITHIN THE PAST 12 MONTHS, YOU WORRIED THAT YOUR FOOD WOULD RUN OUT BEFORE YOU GOT MONEY TO BUY MORE.: NEVER TRUE

## 2025-01-29 SDOH — ECONOMIC STABILITY: FOOD INSECURITY: WITHIN THE PAST 12 MONTHS, THE FOOD YOU BOUGHT JUST DIDN'T LAST AND YOU DIDN'T HAVE MONEY TO GET MORE.: NEVER TRUE

## 2025-01-29 ASSESSMENT — ENCOUNTER SYMPTOMS
DIARRHEA: 0
COUGH: 0
COLOR CHANGE: 0
CONSTIPATION: 0
CHEST TIGHTNESS: 1
SHORTNESS OF BREATH: 0
ABDOMINAL PAIN: 0
SINUS PAIN: 0
BACK PAIN: 0
SINUS PRESSURE: 0
WHEEZING: 0

## 2025-01-29 ASSESSMENT — PATIENT HEALTH QUESTIONNAIRE - PHQ9
1. LITTLE INTEREST OR PLEASURE IN DOING THINGS: NOT AT ALL
SUM OF ALL RESPONSES TO PHQ QUESTIONS 1-9: 0
2. FEELING DOWN, DEPRESSED OR HOPELESS: NOT AT ALL
DEPRESSION UNABLE TO ASSESS: FUNCTIONAL CAPACITY MOTIVATION LIMITS ACCURACY
SUM OF ALL RESPONSES TO PHQ QUESTIONS 1-9: 0
SUM OF ALL RESPONSES TO PHQ QUESTIONS 1-9: 0
SUM OF ALL RESPONSES TO PHQ9 QUESTIONS 1 & 2: 0
SUM OF ALL RESPONSES TO PHQ QUESTIONS 1-9: 0

## 2025-01-29 NOTE — ASSESSMENT & PLAN NOTE
Chronic, not at goal (unstable), continue working on diet changes.  Continue statin.  Next visit.

## 2025-01-29 NOTE — ASSESSMENT & PLAN NOTE
New onset, EKG normal. Suspect exercise intolerance and will improve with continuous exercise. Will check labs. If symptoms persist, consider continuous heart monitor.

## 2025-01-29 NOTE — PROGRESS NOTES
Mark Feliciano (:  1970) is a 54 y.o. male,Established patient, here for evaluation of the following chief complaint(s):  Heart Problem (Present for a few weeks, labored breathing)      ASSESSMENT/PLAN:  1. Tachycardia  Assessment & Plan:  New onset, EKG normal. Suspect exercise intolerance and will improve with continuous exercise. Will check labs. If symptoms persist, consider continuous heart monitor.    Orders:  -     EKG 12 lead; Future  -     TSH reflex to FT4; Future  -     Comprehensive Metabolic Panel; Future  -     CBC with Auto Differential; Future  -     Brain Natriuretic Peptide; Future  2. Fatigue, unspecified type  Assessment & Plan:   Check labs.   Reviewed family history and new onset tachycardia.     Orders:  -     Vitamin D 25 Hydroxy; Future      No follow-ups on file.    SUBJECTIVE/OBJECTIVE:  Heart Problem  Pertinent negatives include no abdominal pain, arthralgias, chest pain, chills, congestion, coughing, fatigue, fever, headaches, myalgias, rash or weakness.     Mark was here today to talk about his ongoing episodes of heart palpitations. He states this has occurred a few times over the last two weeks. He was working out regularly last year until October and attributes these palpitations to his recent weight gain and getting back into exercise. Denies CP, SOB.    He has a significant family history of heart disease so he did not want to let this go on. He says these episodes typically occur when he is more active, having his heart rate go up to the 150's with vigorous exercise. Resting heart rate around 70-80. This then makes him nervous so he fears his heart rate has been elevated more due to some anxiety about having heart issues. He does endorse chest pressure when this happens, denies chest pain or shortness of breath. He is currently taking atorvastatin and lisinopril daily and doing well with these. BP has been well controlled.     Current Outpatient Medications   Medication

## 2025-04-17 NOTE — PROGRESS NOTES
Medication:   Requested Prescriptions     Pending Prescriptions Disp Refills    RAPAFLO 4 MG CAPS capsule 30 capsule 2     Sig: Take 1 capsule by mouth every evening        Last Filled:  6/24/24    Patient Phone Number: [phone] (home) [phone] (work)    Last appt: 1/29/2025   Next appt: Visit date not found    Last OARRS:        No data to display

## 2025-04-18 ENCOUNTER — TELEPHONE (OUTPATIENT)
Dept: FAMILY MEDICINE CLINIC | Age: 55
End: 2025-04-18

## 2025-04-21 ENCOUNTER — HOSPITAL ENCOUNTER (OUTPATIENT)
Dept: GENERAL RADIOLOGY | Age: 55
Discharge: HOME | End: 2025-04-21
Payer: COMMERCIAL

## 2025-04-21 ENCOUNTER — OFFICE VISIT (OUTPATIENT)
Dept: FAMILY MEDICINE CLINIC | Age: 55
End: 2025-04-21
Payer: COMMERCIAL

## 2025-04-21 VITALS
WEIGHT: 256.2 LBS | RESPIRATION RATE: 16 BRPM | OXYGEN SATURATION: 96 % | TEMPERATURE: 96.9 F | HEART RATE: 70 BPM | BODY MASS INDEX: 28.15 KG/M2 | DIASTOLIC BLOOD PRESSURE: 84 MMHG | SYSTOLIC BLOOD PRESSURE: 130 MMHG

## 2025-04-21 DIAGNOSIS — R06.02 SHORTNESS OF BREATH: ICD-10-CM

## 2025-04-21 DIAGNOSIS — R06.02 SHORTNESS OF BREATH: Primary | ICD-10-CM

## 2025-04-21 PROCEDURE — 99214 OFFICE O/P EST MOD 30 MIN: CPT | Performed by: NURSE PRACTITIONER

## 2025-04-21 PROCEDURE — 3079F DIAST BP 80-89 MM HG: CPT | Performed by: NURSE PRACTITIONER

## 2025-04-21 PROCEDURE — 93000 ELECTROCARDIOGRAM COMPLETE: CPT | Performed by: NURSE PRACTITIONER

## 2025-04-21 PROCEDURE — 3075F SYST BP GE 130 - 139MM HG: CPT | Performed by: NURSE PRACTITIONER

## 2025-04-21 PROCEDURE — 71046 X-RAY EXAM CHEST 2 VIEWS: CPT

## 2025-04-21 RX ORDER — SILODOSIN 4 MG/1
4 CAPSULE ORAL EVERY EVENING
Qty: 30 CAPSULE | Refills: 0 | Status: SHIPPED | OUTPATIENT
Start: 2025-04-21 | End: 2025-04-21

## 2025-04-21 RX ORDER — SILODOSIN 4 MG/1
4 CAPSULE ORAL EVERY EVENING
Qty: 30 CAPSULE | Refills: 0 | Status: SHIPPED | OUTPATIENT
Start: 2025-04-21

## 2025-04-21 ASSESSMENT — ENCOUNTER SYMPTOMS
WHEEZING: 0
BACK PAIN: 0
SORE THROAT: 0
DIARRHEA: 0
NAUSEA: 0
BLOOD IN STOOL: 0
COLOR CHANGE: 0
SHORTNESS OF BREATH: 1
ABDOMINAL PAIN: 0
EYE REDNESS: 0
CONSTIPATION: 0
CHEST TIGHTNESS: 0
EYE ITCHING: 0
COUGH: 0
RHINORRHEA: 0
VOMITING: 0
SINUS PRESSURE: 0

## 2025-04-21 ASSESSMENT — PATIENT HEALTH QUESTIONNAIRE - PHQ9
SUM OF ALL RESPONSES TO PHQ QUESTIONS 1-9: 0
2. FEELING DOWN, DEPRESSED OR HOPELESS: NOT AT ALL
SUM OF ALL RESPONSES TO PHQ QUESTIONS 1-9: 0
1. LITTLE INTEREST OR PLEASURE IN DOING THINGS: NOT AT ALL

## 2025-04-21 NOTE — PROGRESS NOTES
Mark Feliciano (:  1970) is a 54 y.o. male,Established patient, here for evaluation of the following chief complaint(s):  Shortness of Breath      ASSESSMENT/PLAN:  1. Shortness of breath  Assessment & Plan:   - Vital signs are within normal limits: blood pressure 130/84, heart rate 70, oxygen saturation 96%.  - Physical examination reveals clear lung sounds, no wheezing, and no leg swelling. EKG results are satisfactory.  - Discussion included potential allergy-related causes. Previous EKG and blood work from 2025 were stable.  - Chest x-ray ordered for further evaluation. Results will be communicated upon receipt.  Orders:  -     EKG 12 Lead  -     XR CHEST STANDARD (2 VW); Future      Assessment & Plan      No follow-ups on file.    SUBJECTIVE/OBJECTIVE:    History of Present Illness  The patient is a 54-year-old male who presents for evaluation of labored breathing.    Labored breathing is reported, which is believed to be related to a previous condition that required a week-long hospitalization in . Symptoms are particularly noticeable at night and have not significantly impacted his lifestyle, though they have made weight loss and physical activity more challenging. No wheezing or exacerbation of symptoms during physical activity is noted. There is no peripheral edema reported. In 2025, he was evaluated by [facility], who suspected exercise intolerance, but no improvement was observed with continuous exercise. An EKG and blood work at that time were stable.      Current Outpatient Medications   Medication Sig Dispense Refill    RAPAFLO 4 MG CAPS capsule Take 1 capsule by mouth every evening 30 capsule 0    lisinopril (PRINIVIL;ZESTRIL) 10 MG tablet Take 1 tablet by mouth daily 90 tablet 1    atorvastatin (LIPITOR) 40 MG tablet Take 1 tablet by mouth daily 90 tablet 1     No current facility-administered medications for this visit.     MEDICATION LIST REVIEWED AND UPDATED AT TIME OF VISIT

## 2025-04-21 NOTE — ASSESSMENT & PLAN NOTE
- Vital signs are within normal limits: blood pressure 130/84, heart rate 70, oxygen saturation 96%.  - Physical examination reveals clear lung sounds, no wheezing, and no leg swelling. EKG results are satisfactory.  - Discussion included potential allergy-related causes. Previous EKG and blood work from 01/2025 were stable.  - Chest x-ray ordered for further evaluation. Results will be communicated upon receipt.

## 2025-04-22 ENCOUNTER — RESULTS FOLLOW-UP (OUTPATIENT)
Dept: FAMILY MEDICINE CLINIC | Age: 55
End: 2025-04-22

## 2025-04-22 DIAGNOSIS — R06.02 SHORTNESS OF BREATH: Primary | ICD-10-CM

## 2025-04-22 RX ORDER — SILODOSIN 4 MG/1
4 CAPSULE ORAL EVERY EVENING
Qty: 30 CAPSULE | Refills: 0 | OUTPATIENT
Start: 2025-04-22

## 2025-04-22 NOTE — TELEPHONE ENCOUNTER
The pharmacy does not carry this name brand but they do carry the generic. Please let the pharmacy know if the generic is ok or if an alternative medication is preferred, thanks.

## 2025-04-23 RX ORDER — SILODOSIN 4 MG/1
4 CAPSULE ORAL EVERY EVENING
Qty: 60 CAPSULE | Refills: 3 | Status: SHIPPED | OUTPATIENT
Start: 2025-04-23

## 2025-06-03 RX ORDER — ATORVASTATIN CALCIUM 40 MG/1
40 TABLET, FILM COATED ORAL DAILY
Qty: 90 TABLET | Refills: 0 | Status: SHIPPED | OUTPATIENT
Start: 2025-06-03

## 2025-06-03 NOTE — TELEPHONE ENCOUNTER
Medication:   Requested Prescriptions     Pending Prescriptions Disp Refills    atorvastatin (LIPITOR) 40 MG tablet 90 tablet 1     Sig: Take 1 tablet by mouth daily       Last Filled:      Patient Phone Number: [phone] (home) [phone] (work)    Last appt: 4/21/2025   Next appt: Visit date not found    Last Lipid: 6/24/24  Lab Results   Component Value Date/Time    CHOL 204 09/18/2017 07:27 AM    TRIG 77 09/18/2017 07:27 AM    HDL 59 06/24/2024 12:48 PM    HDL 63 12/05/2011 10:34 AM

## 2025-06-10 RX ORDER — LISINOPRIL 10 MG/1
10 TABLET ORAL DAILY
Qty: 90 TABLET | Refills: 1 | Status: SHIPPED | OUTPATIENT
Start: 2025-06-10

## 2025-08-28 ENCOUNTER — OFFICE VISIT (OUTPATIENT)
Dept: FAMILY MEDICINE CLINIC | Age: 55
End: 2025-08-28
Payer: COMMERCIAL

## 2025-08-28 VITALS
WEIGHT: 249.6 LBS | SYSTOLIC BLOOD PRESSURE: 138 MMHG | HEIGHT: 78 IN | DIASTOLIC BLOOD PRESSURE: 86 MMHG | TEMPERATURE: 97.3 F | RESPIRATION RATE: 16 BRPM | BODY MASS INDEX: 28.88 KG/M2 | HEART RATE: 77 BPM | OXYGEN SATURATION: 96 %

## 2025-08-28 DIAGNOSIS — Z12.5 PROSTATE CANCER SCREENING: ICD-10-CM

## 2025-08-28 DIAGNOSIS — E78.2 MIXED HYPERLIPIDEMIA: ICD-10-CM

## 2025-08-28 DIAGNOSIS — Z00.00 ENCOUNTER FOR WELL ADULT EXAM WITHOUT ABNORMAL FINDINGS: Primary | ICD-10-CM

## 2025-08-28 DIAGNOSIS — Z23 NEED FOR VACCINATION: ICD-10-CM

## 2025-08-28 PROCEDURE — 90732 PPSV23 VACC 2 YRS+ SUBQ/IM: CPT | Performed by: NURSE PRACTITIONER

## 2025-08-28 PROCEDURE — 90471 IMMUNIZATION ADMIN: CPT | Performed by: NURSE PRACTITIONER

## 2025-08-28 PROCEDURE — 3075F SYST BP GE 130 - 139MM HG: CPT | Performed by: NURSE PRACTITIONER

## 2025-08-28 PROCEDURE — 99396 PREV VISIT EST AGE 40-64: CPT | Performed by: NURSE PRACTITIONER

## 2025-08-28 PROCEDURE — 3079F DIAST BP 80-89 MM HG: CPT | Performed by: NURSE PRACTITIONER

## 2025-08-28 ASSESSMENT — ENCOUNTER SYMPTOMS
PHOTOPHOBIA: 0
ABDOMINAL PAIN: 0
SHORTNESS OF BREATH: 0
COUGH: 0
WHEEZING: 0
BLOOD IN STOOL: 0
CONSTIPATION: 0
BACK PAIN: 0
EYE DISCHARGE: 0
EYE REDNESS: 0
SORE THROAT: 0
DIARRHEA: 0
SINUS PAIN: 0
VOMITING: 0
STRIDOR: 0
NAUSEA: 0
EYE PAIN: 0